# Patient Record
Sex: FEMALE | NOT HISPANIC OR LATINO | Employment: FULL TIME | URBAN - METROPOLITAN AREA
[De-identification: names, ages, dates, MRNs, and addresses within clinical notes are randomized per-mention and may not be internally consistent; named-entity substitution may affect disease eponyms.]

---
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
## 2020-01-08 ENCOUNTER — OFFICE VISIT (OUTPATIENT)
Dept: OBSTETRICS AND GYNECOLOGY | Facility: CLINIC | Age: 20
End: 2020-01-08

## 2020-01-08 VITALS
HEIGHT: 62 IN | BODY MASS INDEX: 22.6 KG/M2 | SYSTOLIC BLOOD PRESSURE: 118 MMHG | DIASTOLIC BLOOD PRESSURE: 66 MMHG | WEIGHT: 122.8 LBS

## 2020-01-08 DIAGNOSIS — Z09 POSTOPERATIVE FOLLOW-UP: Primary | ICD-10-CM

## 2020-01-08 PROCEDURE — 99024 POSTOP FOLLOW-UP VISIT: CPT | Performed by: OBSTETRICS & GYNECOLOGY

## 2020-01-08 NOTE — PROGRESS NOTES
Chief Complaint   Patient presents with   • Post-op       Yoshi Li is a 19 y.o. year old  presenting to be seen for her post-operative visit following a diagnostic laparoscopy done on 2019.  This patient had a history of primary dysmenorrhea.  She has been treated with Ortho-Cyclen oral contraceptives with cycle control and partial relief of dysmenorrhea.  At the time of her laparoscopy there were no abnormal findings.  There was no evidence of endometriosis, PID or adhesive disease.  The patient tolerated the procedure well.    Procedure:  Dx Laparoscopy    ROS:  A comprehensive review of systems was negative.  Constitutional: negative for fever, chills, activity change, appetite change, fatigue and unexpected weight change.  Respiratory: negative   Cardiovascular: negative  Gastrointestinal: negative  Genitourinary:negative  Musculoskeletal:negative  Behavioral/Psych: negative      Symptoms:  Fever  No, Nausea/Vomiting    No, Normal Bowel Function  Yes, Normal Urinary Function  Yes and Abnormal Discharge   No    LMP normal        Physical Exam:  Lungs:  Normal expansion.  Clear to auscultation.  No rales, rhonchi, or wheezing.  Abdomen:  Soft, non-tender, normal bowel sounds; no bruits, organomegaly or masses.  Incisions are intact and dry   Pelvic:  Clinical staff was present for exam  External genitalia:  normal appearance of the external genitalia including Bartholin's and [place]'s glands.  Cervix:  normal appearance.  Uterus:  normal size, shape and consistency. anteverted;  Adnexa:  normal bimanual exam of the adnexa.          Impression: 1) S/P diagnostic laparoscopy for dysmenorrhea with negative findings       PLAN:  1. Follow up: 12 month(s) for AG  2. Continue Ortho-Cyclen oral contraceptives  *Please note that portions of this documentation may have been completed with a voice recognition program.  Efforts were made to edit this dictation, but occasional words may have been  mistranscribed.      This note was electronically signed.    YARELY Veronica MD  January 8, 2020  1:35 PM

## 2020-02-12 ENCOUNTER — OFFICE VISIT (OUTPATIENT)
Dept: GASTROENTEROLOGY | Facility: CLINIC | Age: 20
End: 2020-02-12

## 2020-02-12 VITALS
TEMPERATURE: 98.7 F | SYSTOLIC BLOOD PRESSURE: 108 MMHG | HEART RATE: 63 BPM | WEIGHT: 121.4 LBS | HEIGHT: 62 IN | OXYGEN SATURATION: 99 % | BODY MASS INDEX: 22.34 KG/M2 | DIASTOLIC BLOOD PRESSURE: 58 MMHG

## 2020-02-12 DIAGNOSIS — K58.0 IRRITABLE BOWEL SYNDROME WITH DIARRHEA: ICD-10-CM

## 2020-02-12 DIAGNOSIS — K21.9 CHRONIC GERD: Primary | ICD-10-CM

## 2020-02-12 PROCEDURE — 99213 OFFICE O/P EST LOW 20 MIN: CPT | Performed by: NURSE PRACTITIONER

## 2020-02-12 RX ORDER — DICYCLOMINE HCL 20 MG
20 TABLET ORAL 4 TIMES DAILY PRN
Qty: 30 TABLET | Refills: 2 | Status: SHIPPED | OUTPATIENT
Start: 2020-02-12 | End: 2022-09-26

## 2020-02-12 NOTE — PATIENT INSTRUCTIONS
"Low-Gluten Eating Plan  Gluten is a protein that is found in wheat, barley, rye, and triticale, a hybrid of wheat and rye. Some people have a condition that makes them unable to digest gluten. For those people, eating just a small amount of gluten can damage their intestines.  This is not a gluten-free eating plan. This low-gluten eating plan is for people who feel better when they eat less gluten.  What are tips for following this plan?  Reading food labels  · Make sure to read food labels.  · Look for wheat, rye, barley, oats (unless it says \"certified gluten-free\"), malt, and ricks's yeast. If the food contains any of these, it has gluten in it.  · Wheat-free does not mean gluten-free.  Meal planning  · Eat a variety of foods so you get all of the nutrients that you need.  · To have more control over the ingredients in your meals, consider making food yourself instead of buying prepared foods.  General information  · Many gluten-free grain products are not fortified with vitamins and minerals like gluten-containing grains are. Because of this, there is a risk of nutrient deficiencies if you do not eat a balanced diet. Make sure to meet with your health care provider or a registered dietitian to review your diet.  · When eating out, look for restaurants that have gluten-free options or can make substitutions to accommodate this eating plan. Consider calling a restaurant ahead of time to discuss their menu.  What foods can I eat?    With this eating plan, you can eat anything that is labeled \"gluten-free\" or that does not contain wheat or other grains that have gluten.  Fruits  All fruits, such as bananas, apples, oranges, grapes, papaya, reyna, pomegranate, kiwi, grapefruit, and cherries.  Vegetables  All vegetables that are not in a sauce that would contain gluten, such as lettuce, spinach, peas, beets, cauliflower, cabbage, broccoli, carrots, tomatoes, squash, eggplant, herbs, peppers, onions, cucumbers, " "[place] sprouts, yams, and sweet potatoes.  Grains  Naturally gluten-free grains and flours, including rice, bulgur, quinoa, corn, buckwheat, cassava, amaranth, millet, polenta or cornmeal, tapioca, flax, nerissa, yucca, sorghum, and teff. Corn tortillas or taco shells. Oatmeal that is labeled as \"gluten-free\" or \"uncontaminated.\" Nut flours.  Meats and other proteins  Beef. Pork. Chicken. Turkey. Fish. Eggs. Tofu. Beans. Nuts. Lentils.  Dairy  Milk. Ice cream. Yogurt. Cheese. Cottage cheese.  Beverages  Water. Coffee. Tea. Juice. Soda. [place] water. Distilled alcohols. Wine.  Seasonings and condiments  Mustard. Relish. Ketchup. Barbecue sauce. Vinegar. Mayonnaise. Tamari.  Sweets and desserts  Honey. Sugar. Maple syrup.  Fats and oils  Butter. Vegetable oil. Olive oil. Canola oil. [place] oil.  Other foods  Arrowroot or cornstarch. Potato flour.  The items listed above may not be a complete list of foods and beverages you can eat. Contact a dietitian for more information.  What foods should I avoid?  Grains  Wheat. Barley. Rye. Oatmeal that is not certified gluten-free. Triticale.  Meats and other proteins  Seitan. Precooked or cured meat, such as sausages or meat loaves. Hot dogs. Salami.  Beverages  Beer, antonietta, lager, and malt beverages made from gluten-containing grains.  Seasonings and condiments  Malt vinegar. Salad dressing. Soy sauce. Teriyaki sauce. Marinades. Check the label of any pre-made sauces for a full list of ingredients.  Sweets and desserts  Licorice. Brown rice syrup. Pre-made pudding or pudding mixes.  Other foods  Bouillon cubes. Canned or boxed pre-made soups or soup packets. Bagged chips, such as potato chips and tortilla chips. Seasoning packets. Energy bars. Seasoned rice mixes. Processed foods.  The items listed above may not be a complete list of foods and beverages to avoid. Contact a dietitian for more information.  Summary  · Gluten is a protein that is found in wheat, barley, rye, and " "triticale, a hybrid of wheat and rye.  · This low-gluten eating plan is for people who feel better when they eat less gluten.  · Reading food labels of packaged foods is the best way to make sure you are following a low-gluten eating plan. Look for \"gluten-free\" on the label.  · Eat a variety of foods and colors and use whole grains that are naturally gluten-free to reduce your risk of having any nutrient deficiencies.  This information is not intended to replace advice given to you by your health care provider. Make sure you discuss any questions you have with your health care provider.  Document Released: 05/03/2016 Document Revised: 08/21/2019 Document Reviewed: 08/21/2019  [employer] Interactive Patient Education © 2019 [employer]    Gluten-Free Diet for Celiac Disease, Adult    The gluten-free diet includes all foods that do not contain gluten. Gluten is a protein that is found in wheat, rye, barley, and some other grains. Following the gluten-free diet is the only treatment for people with celiac disease. It helps to prevent damage to the intestines and improves or eliminates the symptoms of celiac disease.  Following the gluten-free diet requires some planning. It can be challenging at first, but it gets easier with time and practice. There are more gluten-free options available today than ever before. If you need help finding gluten-free foods or if you have questions, talk with your diet and nutrition specialist (registered dietitian) or your health care provider.  What do I need to know about a gluten-free diet?  · All fruits, vegetables, and meats are safe to eat and do not contain gluten.  · When grocery shopping, start by shopping in the produce, meat, and dairy sections. These sections are more likely to contain gluten-free foods. Then move to the aisles that contain packaged foods if you need to.  · Read all food labels. Gluten is often added to foods. Always check the ingredient list and look for " "warnings, such as “may contain gluten.\"  · Talk with your dietitian or health care provider before taking a gluten-free multivitamin or mineral supplement.  · Be aware of gluten-free foods having contact with foods that contain gluten (cross-contamination). This can happen at home and with any processed foods.  ? Talk with your health care provider or dietitian about how to reduce the risk of cross-contamination in your home.  ? If you have questions about how a food is processed, ask the .  What key words help to identify gluten?  Foods that list any of these key words on the label usually contain gluten:  · Wheat, flour, enriched flour, bromated flour, white flour, durum flour, lilli flour, phosphated flour, self-rising flour, semolina, farina, barley (malt), rye, and oats.  · Starch, dextrin, modified food starch, or cereal.  · Thickening, fillers, or emulsifiers.  · Malt flavoring, malt extract, or malt syrup.  · Hydrolyzed vegetable protein.  In the [place] packaged foods that are gluten-free are required to be labeled “GF.” These foods should be easy to identify and are safe to eat. In the [place] food companies are also required to list common food allergens, including wheat, on their labels.  Recommended foods  Grains  · Amaranth, bean flours, 100% buckwheat flour, corn, millet, nut flours or nut meals, GF oats, quinoa, rice, sorghum, teff, rice wafers, pure cornmeal tortillas, popcorn, and hot cereals made from cornmeal. [place], rice, wild rice. Some Asian rice noodles or bean noodles. Arrowroot starch, corn bran, corn flour, corn germ, cornmeal, corn starch, potato flour, potato starch flour, and rice bran. Plain, brown, and sweet rice flours. Rice polish, soy flour, and tapioca starch.  Vegetables  · All plain fresh, frozen, and canned vegetables.  Fruits  · All plain fresh, frozen, canned, and dried fruits, and 100% fruit juices.  Meats and other protein foods  · All fresh beef, pork, poultry, " fish, seafood, and eggs. Fish canned in water, oil, brine, or vegetable broth. Plain nuts and seeds, peanut butter. Some lunch meat and some frankfurters. Dried beans, dried peas, and lentils.  Dairy  · Fresh plain, dry, evaporated, or condensed milk. Cream, butter, sour cream, whipping cream, and most yogurts. Unprocessed cheese, most processed cheeses, some cottage cheese, some cream cheeses.  Beverages  · Coffee, tea, most herbal teas. Carbonated beverages and some root beers. Wine, sake, and distilled spirits, such as gin, vodka, and whiskey. Most hard ciders.  Fats and oils  · Butter, margarine, vegetable oil, hydrogenated butter, olive oil, shortening, lard, cream, and some mayonnaise. Some commercial salad dressings. Olives.  Sweets and desserts  · Sugar, honey, some syrups, molasses, jelly, and jam. Plain hard candy, marshmallows, and gumdrops. Pure cocoa powder. Plain chocolate. Custard and some pudding mixes. Gelatin desserts, sorbets, frozen ice pops, and sherbet. Cake, cookies, and other desserts prepared with allowed flours. Some commercial ice creams. Cornstarch, tapioca, and rice puddings.  Seasoning and other foods  · Some canned or frozen soups. Monosodium glutamate (MSG). Cider, rice, and wine vinegar. Baking soda and baking powder. Cream of tartar. Baking and nutritional yeast. Certain soy sauces made without wheat (ask your dietitian about specific brands that are allowed). Nuts, coconut, and chocolate. Salt, pepper, herbs, spices, flavoring extracts, imitation or artificial flavorings, natural flavorings, and food colorings. Some medicines and supplements. Some lip glosses and other cosmetics. Rice syrups.  The items listed may not be a complete list. Talk with your dietitian about what dietary choices are best for you.  Foods to avoid  Grains  · Barley, bran, bulgur, couscous, cracked wheat, [place], farro, lilli, malt, matzo, semolina, wheat germ, and all wheat and rye cereals including spelt  and kamut. Cereals containing malt as a flavoring, such as rice cereal. Noodles, spaghetti, macaroni, most packaged rice mixes, and all mixes containing wheat, rye, barley, or triticale.  Vegetables  · Most creamed vegetables and most vegetables canned in sauces. Some commercially prepared vegetables and salads.  Fruits  · Thickened or prepared fruits and some pie fillings. Some fruit snacks and fruit roll-ups.  Meats and other protein foods  · Any meat or meat alternative containing wheat, rye, barley, or gluten stabilizers. These are often marinated or packaged meats and lunch meats. Bread-containing products, such as Swiss steak, croquettes, meatballs, and meatloaf. Most tuna canned in vegetable broth and turkey with hydrolyzed vegetable protein (HVP) injected as part of the basting. Seitan. Imitation fish. Eggs in sauces made from ingredients to avoid.  Dairy  · Commercial chocolate milk drinks and malted milk. Some non-dairy creamers. Any cheese product containing ingredients to avoid.  Beverages  · Certain cereal beverages. Beer, antonietta, malted milk, and some root beers. Some hard ciders. Some instant flavored coffees. Some herbal teas made with barley or with barley malt added.  Fats and oils  · Some commercial salad dressings. Sour cream containing modified food starch.  Sweets and desserts  · Some toffees. Chocolate-coated nuts (may be rolled in wheat flour) and some commercial candies and candy bars. Most cakes, cookies, donuts, pastries, and other baked goods. Some commercial ice cream. Ice cream cones. Commercially prepared mixes for cakes, cookies, and other desserts. Bread pudding and other puddings thickened with flour. Products containing brown rice syrup made with barley malt enzyme. Desserts and sweets made with malt flavoring.  Seasoning and other foods  · Some marcos powders, some dry seasoning mixes, some gravy extracts, some meat sauces, some ketchups, some prepared mustards, and horseradish.  "Certain soy sauces. Malt vinegar. Bouillon and bouillon cubes that contain HVP. Some chip dips, and some chewing gum. Yeast extract. Mckeon’s yeast. Caramel color. Some medicines and supplements. Some lip glosses and other cosmetics.  The items listed may not be a complete list. Talk with your dietitian about what dietary choices are best for you.  Summary  · Gluten is a protein that is found in wheat, rye, barley, and some other grains. The gluten-free diet includes all foods that do not contain gluten.  · If you need help finding gluten-free foods or if you have questions, talk with your diet and nutrition specialist (registered dietitian) or your health care provider.  · Read all food labels. Gluten is often added to foods. Always check the ingredient list and look for warnings, such as “may contain gluten.\"  This information is not intended to replace advice given to you by your health care provider. Make sure you discuss any questions you have with your health care provider.  Document Released: 12/18/2006 Document Revised: 10/02/2017 Document Reviewed: 10/02/2017  Elsevier Interactive Patient Education © 2019 Elsevier Inc.    "

## 2020-02-12 NOTE — PROGRESS NOTES
GASTROENTEROLOGY OUTPATIENT ESTABLISHED PATIENT NOTE  Patient: CAMERON LI : 2000  Date of Service: 2020  CC: Follow-up    Assessment/Plan                                             ASSESSMENT & PLANS     Chronic GERD  · Decrease Protonix to PRN as needed    Irritable bowel syndrome with diarrhea  -     Start dicyclomine (BENTYL) 20 MG tablet; Take 1 tablet by mouth 4 (Four) Times a Day As Needed (Abdominal Pain/Cramping).  · Low gluten/gluten-free diet      Follow Up:Return if symptoms worsen or fail to improve, for Recheck.      DISCUSSION: The above plan was delineated in details with patient and mother and all questions and concerns were answered.  Patient is also given contact information.  Patient is to return as scheduled or sooner if new problems arise.   Subjective                                                     SUBJECTIVE   History of Present Illness  Ms. Cameron Li is a 19 y.o. female who is here for Follow-up  Changing to Protonix helps some  Still has lots of reflux and nausea.   Cramps and diarrhea. Does not have diarrhea   Has followed dietary changes  Has seen OB-GYN for pelvic pain.      EGD, done on 19 by Dr Simental, [facility]     ROS:Review of Systems   Constitutional: Positive for appetite change.        Pt currently or recently takes NSAIDS ( (i.e Ibuprofen, Aleve, Advil, Exedrin, BC Powder, diclofenac, meloxicam, & Naproxen, etc)? No    Pt currently or recently takes abx? No   HENT: Negative.    Eyes: Negative.    Respiratory: Negative.    Cardiovascular: Negative.    Gastrointestinal: Positive for abdominal pain and nausea.        Acid reflux   Endocrine: Negative.    Genitourinary: Negative.    Musculoskeletal: Negative.    Skin: Negative.    Allergic/Immunologic: Negative.    Neurological: Negative.    Hematological: Negative.    Psychiatric/Behavioral: Negative.      Objective                                                           OBJECTIVE   Allergy:  Pt has No Known Allergies.  MEDS: •  norgestimate-ethinyl estradiol (ORTHO-CYCLEN) 0.25-35 MG-MCG per tablet, Take 1 tablet by mouth Daily., Disp: , Rfl:   •  pantoprazole (PROTONIX) 40 MG EC tablet, Take 1 tablet by mouth Every Morning. Take first thing in the morning on an empty stomach.  Wait at least 30 min to 1hr before eating., Disp: 90 tablet, Rfl: 0  Probiotic      Wt Readings from Last 5 Encounters:   02/12/20 55.1 kg (121 lb 6.4 oz) (38 %, Z= -0.30)*   01/08/20 55.7 kg (122 lb 12.8 oz) (41 %, Z= -0.22)*   11/14/19 54.7 kg (120 lb 9.6 oz) (37 %, Z= -0.32)*   07/31/19 54.4 kg (120 lb) (38 %, Z= -0.32)*   03/22/19 54.2 kg (119 lb 6.4 oz) (38 %, Z= -0.31)*     * Growth percentiles are based on CDC (Girls, 2-20 Years) data.   body mass index is 22.2 kg/m².,Temp: 98.7 °F (37.1 °C),BP: 108/58,Heart Rate: 63   Physical Exam  General Well developed; well nourished. NAD  ENT Anicteric sclerae. Oral mucosa pink and moist without thrush or lesions    GI Abd soft, NT, ND, normal active bowel sounds.  No HSM.  No abd hernia    Pt care team: Samara HAWK & Jaskaran Ryan GINGER  02/12/20 2:43 PM  [facility]--Gastroenterology  [phone]    CC: , Lucille COLLINS MD   [address] [place], [place] [id number] FAX:[phone]    Answers for HPI/ROS submitted by the patient on 2/10/2020   What is the primary reason for your visit?: Other  Please describe your symptoms.: stomach issues  Have you had these symptoms before?: Yes  How long have you been having these symptoms?: 1-4 weeks ago  Please list any medications you are currently taking for this condition.: protonix

## 2020-02-13 DIAGNOSIS — K21.9 CHRONIC GERD: ICD-10-CM

## 2020-02-13 RX ORDER — PANTOPRAZOLE SODIUM 40 MG/1
40 TABLET, DELAYED RELEASE ORAL EVERY MORNING
Qty: 90 TABLET | Refills: 0 | Status: SHIPPED | OUTPATIENT
Start: 2020-02-13 | End: 2020-04-27

## 2020-02-28 ENCOUNTER — PATIENT MESSAGE (OUTPATIENT)
Dept: GASTROENTEROLOGY | Facility: CLINIC | Age: 20
End: 2020-02-28

## 2020-03-02 NOTE — TELEPHONE ENCOUNTER
From: Yoshi Li  To: Samara Saldivar APRN  Sent: 2/28/2020 9:21 AM EST  Subject: Non-Urgent Medical Question    Hi was just contacting you to see if it would be possible to get an excuse for missing my class today as my stomach was bothering me too much to attend class. Thank you.

## 2020-03-03 ENCOUNTER — PATIENT MESSAGE (OUTPATIENT)
Dept: GASTROENTEROLOGY | Facility: CLINIC | Age: 20
End: 2020-03-03

## 2020-03-05 NOTE — TELEPHONE ENCOUNTER
From: Yoshi Li  To: Samara Saldivar APRN  Sent: 3/3/2020 10:38 PM EST  Subject: Non-Urgent Medical Question    Is it possible to get a letter I can send to my professors that excuses me for any other times I might have to miss due to my stomach? My professors all require excuses within 24 hours of the absences and I know it can be difficult to get an excuse with that short of notice. I just want to avoid being unexcused for something that may have a huge impact on my grades. Thank you so much.

## 2020-03-06 ENCOUNTER — TELEPHONE (OUTPATIENT)
Dept: GASTROENTEROLOGY | Facility: CLINIC | Age: 20
End: 2020-03-06

## 2020-03-06 NOTE — TELEPHONE ENCOUNTER
Jaskaran:  Tracy, patient mother, called and would like to speak to you regarding Yoshi. Please return her call @ [phone].  Thank you,  Britta

## 2020-03-09 ENCOUNTER — PATIENT MESSAGE (OUTPATIENT)
Dept: GASTROENTEROLOGY | Facility: CLINIC | Age: 20
End: 2020-03-09

## 2020-03-17 NOTE — TELEPHONE ENCOUNTER
From: Emelina Li  To: Samara Saldivar APRN  Sent: 3/9/2020 2:24 PM EDT  Subject: Non-Urgent Medical Question    There are not specific dates I need an excuse for, I was just seeing if it would be possible to get a letter from you gulorie to give to my professors explaining my stomach issues in the hope to be able to make up any work I may miss due to my stomach. Professors will not accept an excuse later than 24 hours after the date of absence therefore the excuse you sent me before was invalid and I was not able to make up the work. However, if I had a note explaining my stomach issues they may accept that and allow me to be excused for any work I may miss in the future due to a flare up.  ----- Message -----  From: HERIBERTO MOORE  Sent: 3/5/2020 10:52 AM EST  To: Emelina Li  Subject: RE: Non-Urgent Medical Question   Emelina,  What dates do you need for additional school excuse?     ----- Message -----   From: Emelina Li   Sent: 3/3/2020 10:38 PM EST   To: KENN Trinidad  Subject: Non-Urgent Medical Question    Is it possible to get a letter I can send to my professors that excuses me for any other times I might have to miss due to my stomach? My professors all require excuses within 24 hours of the absences and I know it can be difficult to get an excuse with that short of notice. I just want to avoid being unexcused for something that may have a huge impact on my grades. Thank you so much.

## 2020-03-18 ENCOUNTER — PATIENT MESSAGE (OUTPATIENT)
Dept: GASTROENTEROLOGY | Facility: CLINIC | Age: 20
End: 2020-03-18

## 2020-03-19 NOTE — TELEPHONE ENCOUNTER
From: Emelina Li  To: Samara Saldivar APRN  Sent: 3/18/2020 4:23 PM EDT  Subject: Non-Urgent Medical Question    It’s not listed under the letters unfortunately. A copy sent by mail would be great! Thank you.  ----- Message -----  From: HERIBERTO MOORE  Sent: 3/17/2020 11:35 AM EDT  To: Emelina Li  Subject: RE: Non-Urgent Medical Question  Its should be under letters in your my chart. I don't have any control over my chart but I will be glad to mail the letter to you. NINO Jessica.    ----- Message -----   From: Emelina Li   Sent: 3/9/2020 2:24 PM EDT   To: KENN Trinidad  Subject: Non-Urgent Medical Question    There are not specific dates I need an excuse for, I was just seeing if it would be possible to get a letter from you guys to give to my professors explaining my stomach issues in the hope to be able to make up any work I may miss due to my stomach. Professors will not accept an excuse later than 24 hours after the date of absence therefore the excuse you sent me before was invalid and I was not able to make up the work. However, if I had a note explaining my stomach issues they may accept that and allow me to be excused for any work I may miss in the future due to a flare up.  ----- Message -----  From: HERIBERTO MOORE  Sent: 3/5/2020 10:52 AM EST  To: Emelina Li  Subject: RE: Non-Urgent Medical Question   Emelina,  What dates do you need for additional school excuse?     ----- Message -----   From: Emelina Li   Sent: 3/3/2020 10:38 PM EST   To: KENN Trinidad  Subject: Non-Urgent Medical Question    Is it possible to get a letter I can send to my professors that excuses me for any other times I might have to miss due to my stomach? My professors all require excuses within 24 hours of the absences and I know it can be difficult to get an excuse with that short of notice. I just want to avoid being unexcused for something  that may have a huge impact on my grades. Thank you so much.

## 2020-04-26 DIAGNOSIS — K21.9 CHRONIC GERD: ICD-10-CM

## 2020-04-27 RX ORDER — PANTOPRAZOLE SODIUM 40 MG/1
TABLET, DELAYED RELEASE ORAL
Qty: 30 TABLET | Refills: 0 | Status: SHIPPED | OUTPATIENT
Start: 2020-04-27 | End: 2020-09-25 | Stop reason: SDUPTHER

## 2020-09-21 ENCOUNTER — TELEPHONE (OUTPATIENT)
Dept: OBSTETRICS AND GYNECOLOGY | Facility: CLINIC | Age: 20
End: 2020-09-21

## 2020-09-21 RX ORDER — NORGESTIMATE AND ETHINYL ESTRADIOL 0.25-0.035
1 KIT ORAL DAILY
Qty: 84 TABLET | Refills: 1 | Status: SHIPPED | OUTPATIENT
Start: 2020-09-21 | End: 2020-09-29 | Stop reason: SDUPTHER

## 2020-09-21 NOTE — TELEPHONE ENCOUNTER
----- Message from Kenyatta Still MA sent at 9/21/2020  8:30 AM EDT -----  Regarding: FW: Prescription Question  Contact: [phone]    ----- Message -----  From: Yoshi Li  Sent: 9/18/2020  10:55 AM EDT  To: [facility]  Subject: RE: Prescription Question                        [facility] Thank you so much!   ----- Message -----  From: HERIBERTO MILLAN  Sent: 9/18/20, 10:54 AM  To: Yoshi Li  Subject: RE: Prescription Question    Does the prescription need to go to [facility] in [place], or to [facility]?      ----- Message -----       From:Yoshi Li       Sent:9/17/2020 10:13 AM EDT         To:YARELY Veronica MD    Subject:Prescription Question    Hi, I would like to get my prescription refilled as I only have one month supply left and I do not have an appointment with you gulorie until December. Thank you!

## 2020-09-21 NOTE — TELEPHONE ENCOUNTER
----- Message from Kenyatta Still MA sent at 9/21/2020  8:30 AM EDT -----  Regarding: FW: Prescription Question  Contact: [phone]    ----- Message -----  From: Yoshi Li  Sent: 9/18/2020  10:55 AM EDT  To: [facility]  Subject: RE: Prescription Question                        [facility] Thank you so much!   ----- Message -----  From: HERIBERTO MILLAN  Sent: 9/18/20, 10:54 AM  To: Yoshi Li  Subject: RE: Prescription Question    Does the prescription need to go to [facility] [place], or to [facility]?      ----- Message -----       From:Yoshi Li       Sent:9/17/2020 10:13 AM EDT         To:YARELY Veronica MD    Subject:Prescription Question    Hi, I would like to get my prescription refilled as I only have one month supply left and I do not have an appointment with you gulorie until December. Thank you!

## 2020-09-25 DIAGNOSIS — K21.9 CHRONIC GERD: ICD-10-CM

## 2020-09-25 RX ORDER — PANTOPRAZOLE SODIUM 40 MG/1
40 TABLET, DELAYED RELEASE ORAL DAILY
Qty: 90 TABLET | Refills: 0 | Status: SHIPPED | OUTPATIENT
Start: 2020-09-25 | End: 2020-12-24

## 2020-09-29 ENCOUNTER — TELEPHONE (OUTPATIENT)
Dept: OBSTETRICS AND GYNECOLOGY | Facility: CLINIC | Age: 20
End: 2020-09-29

## 2020-09-29 RX ORDER — NORGESTIMATE AND ETHINYL ESTRADIOL 0.25-0.035
1 KIT ORAL DAILY
Qty: 84 TABLET | Refills: 1 | Status: SHIPPED | OUTPATIENT
Start: 2020-09-29 | End: 2020-12-21

## 2020-09-29 NOTE — TELEPHONE ENCOUNTER
Appointment 12/21/2020.  We received a fax request for patient's birth control pills.  This prescription had been sent to her local pharmacy.  Transferring prescription to [facility]

## 2020-10-19 RX ORDER — NORGESTIMATE AND ETHINYL ESTRADIOL 0.25-0.035
1 KIT ORAL DAILY
Qty: 28 TABLET | Refills: 0 | Status: SHIPPED | OUTPATIENT
Start: 2020-10-19 | End: 2020-10-19 | Stop reason: SDUPTHER

## 2020-10-19 RX ORDER — NORGESTIMATE AND ETHINYL ESTRADIOL 0.25-0.035
1 KIT ORAL DAILY
Qty: 28 TABLET | Refills: 0 | Status: SHIPPED | OUTPATIENT
Start: 2020-10-19 | End: 2020-12-21 | Stop reason: ALTCHOICE

## 2020-10-19 NOTE — TELEPHONE ENCOUNTER
Patient's mother calls today regarding Yoshi's birth control pill prescription.  The patient is having difficulty getting her prescription from [facility], although our records indicate that the prescription was sent to [facility] and received on 9/29/2020.  The patient's mother states that [facility] has stated that they will mail prescription to patient today, but she needs to start pills now.  They are requesting a one time prescription to be sent to the local pharmacy.    Dr. Veronica, please approve prescription.  Patient has appointment in December, and if she receives 3 packs from [facility], should not need another prescription until the time of her appointment.

## 2020-10-20 NOTE — TELEPHONE ENCOUNTER
I called the patient's mother, Tracy, because our records indicate that the prescription failed to transmit to the pharmacy X 2.  I also called the pharmacy several times this morning, and after one ring, the call does not connect.    The patient's mother indicates that Yoshi's prescription was at the pharmacy and she picked it up last night.

## 2020-12-21 ENCOUNTER — OFFICE VISIT (OUTPATIENT)
Dept: OBSTETRICS AND GYNECOLOGY | Facility: CLINIC | Age: 20
End: 2020-12-21

## 2020-12-21 VITALS
BODY MASS INDEX: 23.08 KG/M2 | HEIGHT: 62 IN | WEIGHT: 125.4 LBS | DIASTOLIC BLOOD PRESSURE: 80 MMHG | SYSTOLIC BLOOD PRESSURE: 128 MMHG

## 2020-12-21 DIAGNOSIS — N94.6 DYSMENORRHEA: ICD-10-CM

## 2020-12-21 DIAGNOSIS — N60.11 FIBROCYSTIC BREAST CHANGES, BILATERAL: Primary | ICD-10-CM

## 2020-12-21 DIAGNOSIS — Z01.419 ENCOUNTER FOR GYNECOLOGICAL EXAMINATION WITHOUT ABNORMAL FINDING: ICD-10-CM

## 2020-12-21 DIAGNOSIS — N60.12 FIBROCYSTIC BREAST CHANGES, BILATERAL: Primary | ICD-10-CM

## 2020-12-21 DIAGNOSIS — Z30.41 SURVEILLANCE OF PREVIOUSLY PRESCRIBED CONTRACEPTIVE PILL: ICD-10-CM

## 2020-12-21 PROCEDURE — 99395 PREV VISIT EST AGE 18-39: CPT | Performed by: OBSTETRICS & GYNECOLOGY

## 2020-12-21 RX ORDER — FAMOTIDINE 40 MG/1
1 TABLET, FILM COATED ORAL NIGHTLY
COMMUNITY
Start: 2020-11-30 | End: 2021-01-20

## 2020-12-21 RX ORDER — LEVONORGESTREL / ETHINYL ESTRADIOL AND ETHINYL ESTRADIOL 150-30(84)
1 KIT ORAL DAILY
Qty: 91 EACH | Refills: 4 | Status: SHIPPED | OUTPATIENT
Start: 2020-12-21 | End: 2021-12-22 | Stop reason: SDUPTHER

## 2020-12-21 NOTE — PROGRESS NOTES
Chief Complaint   Patient presents with   • Med Refill     discuss contraception.   • Annual Exam     ? IBS.  patient has cramping starting 1 week prior to her period and lasting during the period.  unsure if this is dysmenorrhea or IBS.       Yoshi Li is a 20 y.o. year old  presenting to be seen for her annual exam.  This patient has a history of a prior laparoscopy  In 2019 with normal pelvic findings.  There was no evidence of endometriosis or pelvic adhesive disease.  She has been taking Ortho-Cyclen oral contraceptives but still has some discomfort in her lower abdomen at the time of menses.  She does have a history of irritable bowel syndrome-diarrhea dominant.  She denies side effects on oral contraceptives.  She denies urinary symptoms.  She has a history of fibrocystic changes of the breast.  SCREENING TESTS    Year 2012 2016  2018 2019 2020  202 [id number] 2030  203 2033   Age                         PAP        Neg.                 HPV high risk                         Mammogram                         FAMILIA score                         Breast MRI                         Lipids                         Vitamin D                         Colonoscopy                         DEXA  Frax (hip/any)                         Ovarian Screen                             She exercises regularly: yes.  She wears her seat belt: yes.  She has concerns about domestic violence: no.  She has noticed changes in height: no    GYN screening history:  · Last pap: was done on approximately 2019 and the result was: normal PAP..    No Additional Complaints Reported    The following portions of the patient's history were reviewed and updated as appropriate:vital signs and   She  has a past medical history of GERD (gastroesophageal reflux disease), IBS (irritable bowel syndrome), and IBS (irritable bowel syndrome).  She does not have any pertinent  "problems on file.  She  has a past surgical history that includes Mouth surgery; [place] tooth extraction; Diagnostic laparoscopy (12/20/2019); Abdominal surgery (laparoscopy December 20189); and Upper gastrointestinal endoscopy (August 2019).  Her Family history is unknown by patient.  She  reports that she has never smoked. She has never used smokeless tobacco. She reports that she does not drink alcohol or use drugs.  Current Outpatient Medications   Medication Sig Dispense Refill   • dicyclomine (BENTYL) 20 MG tablet Take 1 tablet by mouth 4 (Four) Times a Day As Needed (Abdominal Pain/Cramping). 30 tablet 2   • famotidine (PEPCID) 40 MG tablet Take 1 tablet by mouth Every Night.     • hyoscyamine (LEVSIN) 0.125 MG SL tablet As Needed.     • Levonorgest-Eth Estrad 91-Day (Seasonique) 0.15-0.03 &0.01 MG tablet Take 1 tablet by mouth Daily. 91 each 4   • pantoprazole (PROTONIX) 40 MG EC tablet Take 1 tablet by mouth Daily for 90 days. 90 tablet 0     No current facility-administered medications for this visit.      She has No Known Allergies..    Review of Systems  A review of systems was taken.  She denies cough, fever, shortness of breath, and loss of her sense of taste or smell  Constitutional: negative for fever, chills, activity change, appetite change, fatigue and unexpected weight change.  Respiratory: negative  Cardiovascular: negative  Gastrointestinal: diarrhea  Genitourinary:negative  Musculoskeletal:negative  Behavioral/Psych: negative     Counseling/Anticipatory Guidance Discussed: nutrition, family planning/contraception, physical activity, healthy weight and breast cancer and self breast exams    /80   Ht 157.5 cm (62\")   Wt 56.9 kg (125 lb 6.4 oz)   LMP 12/09/2020 (Exact Date)   Breastfeeding No   BMI 22.94 kg/m²     MEDICALLY INDICATED   Physical Exam    General:  alert; cooperative; well developed; well nourished   Skin:  No suspicious lesions seen   Thyroid: normal to inspection and " palpation   Lungs:  breathing is unlabored  clear to auscultation bilaterally   Heart:  regular rate and rhythm, S1, S2 normal, no murmur, click, rub or gallop  normal apical impulse   Breasts:  Examined in supine position  Symmetric without masses or skin dimpling  Nipples normal without inversion, lesions or discharge  There are no palpable axillary nodes  Fibrocystic changes are present both breasts without a discrete mass   Abdomen: soft, non-tender; no masses  no umbilical or inguinal hernias are present  no hepato-splenomegaly   Pelvis: Clinical staff was present for exam  External genitalia:  normal appearance of the external genitalia including Bartholin's and [place]'s glands.  Vaginal:  normal pink mucosa without prolapse or lesions.  Cervix:  normal appearance.  Uterus:  normal size, shape and consistency. anteverted;  Adnexa:  normal bimanual exam of the adnexa.  Rectal:  anus visually normal appearing. recto-vaginal exam unremarkable and confirms findings;     Lab Review   Pap results    Imaging  No data reviewed        Advance directives- NO (age)      ASSESSMENT  Problems Addressed this Visit        Nervous and Auditory    Dysmenorrhea       Other    Fibrocystic breast changes, bilateral - Primary      Other Visit Diagnoses     Surveillance of previously prescribed contraceptive pill        Relevant Medications    Levonorgest-Eth Estrad 91-Day (Seasonique) 0.15-0.03 &0.01 MG tablet    Encounter for gynecological examination without abnormal finding        Relevant Orders    Liquid-based Pap Smear, Screening      Diagnoses       Codes Comments    Fibrocystic breast changes, bilateral    -  Primary ICD-10-CM: N60.11, N60.12  ICD-9-CM: 610.1     Surveillance of previously prescribed contraceptive pill     ICD-10-CM: Z30.41  ICD-9-CM: V25.41     Encounter for gynecological examination without abnormal finding     ICD-10-CM: Z01.419  ICD-9-CM: V72.31     Dysmenorrhea     ICD-10-CM: N94.6  ICD-9-CM: 625.3                Substance History:   reports that she has never smoked. She has never used smokeless tobacco.   reports no history of alcohol use.   reports no history of drug use.    Substance use counseling is not indicated based on patient history.      PLAN    Medications prescribed this encounter:    New Medications Ordered This Visit   Medications   • Levonorgest-Eth Estrad 91-Day (Seasonique) 0.15-0.03 &0.01 MG tablet     Sig: Take 1 tablet by mouth Daily.     Dispense:  91 each     Refill:  4   · Pap test done  · After discussion with the patient I have elected to change her to Seasonique oral contraceptives to attempt to decrease the number of periods that she has.  Have also encouraged her to follow-up with gastroenterology.  · Regular weight-bearing exercise  · Follow up: 12 month(s)  *Please note that portions of this documentation may have been completed with a voice recognition program.  Efforts were made to edit this dictation, but occasional words may have been mistranscribed.       This note was electronically signed.    YARELY Veronica MD  December 21, 2020  16:07 EST

## 2020-12-30 DIAGNOSIS — K58.1 IRRITABLE BOWEL SYNDROME WITH CONSTIPATION: Primary | ICD-10-CM

## 2021-01-20 ENCOUNTER — TELEMEDICINE (OUTPATIENT)
Dept: GASTROENTEROLOGY | Facility: CLINIC | Age: 21
End: 2021-01-20

## 2021-01-20 DIAGNOSIS — K58.0 IRRITABLE BOWEL SYNDROME WITH DIARRHEA: Primary | ICD-10-CM

## 2021-01-20 DIAGNOSIS — K21.9 CHRONIC GERD: ICD-10-CM

## 2021-01-20 PROCEDURE — 99214 OFFICE O/P EST MOD 30 MIN: CPT | Performed by: NURSE PRACTITIONER

## 2021-01-20 RX ORDER — PANTOPRAZOLE SODIUM 20 MG/1
20 TABLET, DELAYED RELEASE ORAL DAILY
COMMUNITY
End: 2021-01-20

## 2021-01-20 RX ORDER — PANTOPRAZOLE SODIUM 40 MG/1
40 TABLET, DELAYED RELEASE ORAL DAILY
COMMUNITY
End: 2021-01-20

## 2021-01-20 RX ORDER — PANTOPRAZOLE SODIUM 20 MG/1
20 TABLET, DELAYED RELEASE ORAL 2 TIMES DAILY
Qty: 60 TABLET | Refills: 11 | Status: SHIPPED | OUTPATIENT
Start: 2021-01-20 | End: 2021-01-25 | Stop reason: SDUPTHER

## 2021-01-20 NOTE — PATIENT INSTRUCTIONS
Low-FODMAP Eating Plan    FODMAPs (fermentable oligosaccharides, disaccharides, monosaccharides, and polyols) are sugars that are hard for some people to digest. A low-FODMAP eating plan may help some people who have bowel (intestinal) diseases to manage their symptoms.  This meal plan can be complicated to follow. Work with a diet and nutrition specialist (dietitian) to make a low-FODMAP eating plan that is right for you. A dietitian can make sure that you get enough nutrition from this diet.  What are tips for following this plan?  Reading food labels  · Check labels for hidden FODMAPs such as:  ? High-fructose syrup.  ? Honey.  ? Agave.  ? Natural fruit flavors.  ? Onion or garlic powder.  · Choose low-FODMAP foods that contain 3-4 grams of fiber per serving.  · Check food labels for serving sizes. Eat only one serving at a time to make sure FODMAP levels stay low.  Meal planning  · Follow a low-FODMAP eating plan for up to 6 weeks, or as told by your health care provider or dietitian.  · To follow the eating plan:  1. Eliminate high-FODMAP foods from your diet completely.  2. Gradually reintroduce high-FODMAP foods into your diet one at a time. Most people should wait a few days after introducing one high-FODMAP food before they introduce the next high-FODMAP food. Your dietitian can recommend how quickly you may reintroduce foods.  3. Keep a daily record of what you eat and drink, and make note of any symptoms that you have after eating.  4. Review your daily record with a dietitian regularly. Your dietitian can help you identify which foods you can eat and which foods you should avoid.  General tips  · Drink enough fluid each day to keep your urine pale yellow.  · Avoid processed foods. These often have added sugar and may be high in FODMAPs.  · Avoid most dairy products, whole grains, and sweeteners.  · Work with a dietitian to make sure you get enough fiber in your diet.  Recommended  "foods  Grains  · Gluten-free grains, such as rice, oats, buckwheat, quinoa, corn, polenta, and millet. Gluten-free pasta, bread, or cereal. Rice noodles. Corn tortillas.  Vegetables  · Eggplant, zucchini, cucumber, peppers, green beans, [place] sprouts, bean sprouts, lettuce, arugula, kale, Swiss chard, spinach, juan greens, bok donald, summer squash, potato, and tomato. Limited amounts of corn, carrot, and sweet potato. Green parts of scallions.  Fruits  · Bananas, oranges, re, limes, blueberries, raspberries, strawberries, grapes, cantaloupe, honeydew melon, kiwi, papaya, passion fruit, and pineapple. Limited amounts of dried cranberries, banana chips, and shredded coconut.  Dairy  · Lactose-free milk, yogurt, and kefir. Lactose-free cottage cheese and ice cream. Non-dairy milks, such as almond, coconut, hemp, and rice milk. Yogurts made of non-dairy milks. Limited amounts of goat cheese, brie, mozzarella, parmesan, swiss, and other hard cheeses.  Meats and other protein foods  · Unseasoned beef, pork, poultry, or fish. Eggs. Harry. Tofu (firm) and tempeh. Limited amounts of nuts and seeds, such as almonds, walnuts, brazil nuts, pecans, peanuts, pumpkin seeds, nerissa seeds, and sunflower seeds.  Fats and oils  · Butter-free spreads. Vegetable oils, such as olive, canola, and sunflower oil.  Seasoning and other foods  · Artificial sweeteners with names that do not end in \"ol\" such as aspartame, saccharine, and stevia. Maple syrup, white table sugar, raw sugar, brown sugar, and molasses. Fresh basil, coriander, parsley, rosemary, and thyme.  Beverages  · Water and mineral water. Sugar-sweetened soft drinks. Small amounts of orange juice or cranberry juice. Black and green tea. Most dry shannan. Coffee.  This may not be a complete list of low-FODMAP foods. Talk with your dietitian for more information.  Foods to avoid  Grains  · Wheat, including kamut, durum, and semolina. Barley and bulgur. Couscous. Wheat-based " cereals. Wheat noodles, bread, crackers, and pastries.  Vegetables  · Chicory root, artichoke, asparagus, cabbage, snow peas, sugar snap peas, mushrooms, and cauliflower. Onions, garlic, leeks, and the white part of scallions.  Fruits  · Fresh, dried, and juiced forms of apple, pear, watermelon, peach, plum, cherries, apricots, blackberries, boysenberries, figs, nectarines, and reyna. Avocado.  Dairy  · Milk, yogurt, ice cream, and soft cheese. Cream and sour cream. Milk-based sauces. Custard.  Meats and other protein foods  · Fried or fatty meat. Sausage. Cashews and pistachios. Soybeans, baked beans, black beans, chickpeas, kidney beans, arabella beans, navy beans, lentils, and split peas.  Seasoning and other foods  · Any sugar-free gum or candy. Foods that contain artificial sweeteners such as sorbitol, mannitol, isomalt, or xylitol. Foods that contain honey, high-fructose corn syrup, or agave. Bouillon, vegetable stock, beef stock, and chicken stock. Garlic and onion powder. Condiments made with onion, such as hummus, chutney, pickles, relish, salad dressing, and salsa. Tomato paste.  Beverages  · Chicory-based drinks. Coffee substitutes. Chamomile tea. Fennel tea. Sweet or fortified shannan such as port or lucila. Diet soft drinks made with isomalt, mannitol, maltitol, sorbitol, or xylitol. Apple, pear, and reyna juice. Juices with high-fructose corn syrup.  This may not be a complete list of high-FODMAP foods. Talk with your dietitian to discuss what dietary choices are best for you.   Summary  · A low-FODMAP eating plan is a short-term diet that eliminates FODMAPs from your diet to help ease symptoms of certain bowel diseases.  · The eating plan usually lasts up to 6 weeks. After that, high-FODMAP foods are restarted gradually, one at a time, so you can find out which may be causing symptoms.  · A low-FODMAP eating plan can be complicated. It is best to work with a dietitian who has experience with this type of  plan.  This information is not intended to replace advice given to you by your health care provider. Make sure you discuss any questions you have with your health care provider.  Document Revised: 11/30/2018 Document Reviewed: 08/14/2018  Elsevier Patient Education © 2020 [employer]

## 2021-01-20 NOTE — PROGRESS NOTES
Follow Up      Patient Name: Yoshi Li  : 2000   MRN: [id number]     Chief Complaint:    Chief Complaint   Patient presents with   • Irritable Bowel Syndrome     You have chosen to receive care through a telehealth visit.  Do you consent to use a video/audio connection for your medical care today? Yes    History of Present Illness: Yoshi Li is a 20 y.o. female who is here today for follow up on IBS.  Symptoms include cramping with diarrhea.  Symptoms are intermittent- several times a week.  Cramping is relieved with defecation.  Bentyl is helpful but causes drowsiness.  Taking a PPI daily and pepcid was added in by pcp due to GERD breakthrough in the evening.  Tried levsin which did not help much. Has tried FODMAPS some but not fully.  Tries to avoid gluten which helped her symptoms. Probiotics help as well.      Subjective      Review of Systems:   Review of Systems    Medications:     Current Outpatient Medications:   •  dicyclomine (BENTYL) 20 MG tablet, Take 1 tablet by mouth 4 (Four) Times a Day As Needed (Abdominal Pain/Cramping)., Disp: 30 tablet, Rfl: 2  •  Levonorgest-Eth Estrad 91-Day (Seasonique) 0.15-0.03 &0.01 MG tablet, Take 1 tablet by mouth Daily., Disp: 91 each, Rfl: 4  •  pantoprazole (PROTONIX) 20 MG EC tablet, Take 1 tablet by mouth 2 (two) times a day., Disp: 60 tablet, Rfl: 11  •  rifAXIMin (Xifaxan) 200 MG tablet, Take 1 tablet by mouth 3 (Three) Times a Day., Disp: 42 tablet, Rfl: 1    Allergies:   No Known Allergies    Social History:   Social History     Socioeconomic History   • Marital status: Single     Spouse name: Not on file   • Number of children: Not on file   • Years of education: Not on file   • Highest education level: Not on file   Tobacco Use   • Smoking status: Never Smoker   • Smokeless tobacco: Never Used   Substance and Sexual Activity   • Alcohol use: No     Frequency: Never   • Drug use: No   • Sexual activity: Not Currently     Birth  control/protection: OCP        Surgical History:   Past Surgical History:   Procedure Laterality Date   • ABDOMINAL SURGERY  laparoscopy December 20189   • DIAGNOSTIC LAPAROSCOPY  12/20/2019   • MOUTH SURGERY      wisdom teeth   • UPPER GASTROINTESTINAL ENDOSCOPY  August 2019   • WISDOM TOOTH EXTRACTION          Medical History:   Past Medical History:   Diagnosis Date   • GERD (gastroesophageal reflux disease)    • IBS (irritable bowel syndrome)    • IBS (irritable bowel syndrome)         Objective     Physical Exam:  Vital Signs: There were no vitals filed for this visit.  There is no height or weight on file to calculate BMI.     Physical Exam  Constitutional:       General: She is not in acute distress.     Appearance: She is well-developed.   Pulmonary:      Effort: Pulmonary effort is normal. No accessory muscle usage or respiratory distress.   Skin:     Coloration: Skin is not pale.      Findings: No erythema.   Neurological:      Mental Status: She is alert and oriented to person, place, and time.   Psychiatric:         Speech: Speech normal.         Behavior: Behavior normal.         Thought Content: Thought content normal.         Judgment: Judgment normal.         Assessment / Plan      Assessment/Plan:   Diagnoses and all orders for this visit:    1. Irritable bowel syndrome with diarrhea (Primary)  -     rifAXIMin (Xifaxan) 200 MG tablet; Take 1 tablet by mouth 3 (Three) Times a Day.  Dispense: 42 tablet; Refill: 1  Continue to work on FODMAPs diet, as needed Bentyl, probiotics.  Will trial Xifaxan as this may give her some relief.  Advised she may repeat this so will provide 1 refill.  2. Chronic GERD  -     pantoprazole (PROTONIX) 20 MG EC tablet; Take 1 tablet by mouth 2 (two) times a day.  Dispense: 60 tablet; Refill: 11  EGD reviewed.  I recommend against 40 of pantoprazole and Pepcid.  Will discontinue Pepcid and try PPI twice daily-30 minutes before meals.         Follow Up:   Return in about 6  months (around 7/20/2021), or if symptoms worsen or fail to improve.    Plan of care reviewed with the patient at the conclusion of today's visit.  Education was provided regarding diagnosis, management, and any prescribed or recommended OTC medications.  Patient verbalized understanding of and agreement with management plan.     KENN Damon  [facility]     Please note that portions of this note may have been completed with a voice recognition program. Efforts were made to edit the dictations, but occasionally words are mistranscribed.

## 2021-01-25 DIAGNOSIS — K21.9 CHRONIC GERD: ICD-10-CM

## 2021-01-25 RX ORDER — PANTOPRAZOLE SODIUM 20 MG/1
20 TABLET, DELAYED RELEASE ORAL 2 TIMES DAILY
Qty: 60 TABLET | Refills: 11 | Status: SHIPPED | OUTPATIENT
Start: 2021-01-25 | End: 2021-12-21

## 2021-07-27 ENCOUNTER — OFFICE VISIT (OUTPATIENT)
Dept: GASTROENTEROLOGY | Facility: CLINIC | Age: 21
End: 2021-07-27

## 2021-07-27 ENCOUNTER — LAB (OUTPATIENT)
Dept: LAB | Facility: HOSPITAL | Age: 21
End: 2021-07-27

## 2021-07-27 VITALS
SYSTOLIC BLOOD PRESSURE: 118 MMHG | WEIGHT: 129.2 LBS | TEMPERATURE: 97.9 F | BODY MASS INDEX: 23.77 KG/M2 | HEART RATE: 113 BPM | DIASTOLIC BLOOD PRESSURE: 64 MMHG | HEIGHT: 62 IN | OXYGEN SATURATION: 99 %

## 2021-07-27 DIAGNOSIS — R53.82 CHRONIC FATIGUE: ICD-10-CM

## 2021-07-27 DIAGNOSIS — R11.14 BILIOUS VOMITING WITH NAUSEA: ICD-10-CM

## 2021-07-27 DIAGNOSIS — K58.0 IRRITABLE BOWEL SYNDROME WITH DIARRHEA: ICD-10-CM

## 2021-07-27 DIAGNOSIS — K21.9 GASTROESOPHAGEAL REFLUX DISEASE WITHOUT ESOPHAGITIS: Primary | ICD-10-CM

## 2021-07-27 PROCEDURE — 86038 ANTINUCLEAR ANTIBODIES: CPT

## 2021-07-27 PROCEDURE — 36415 COLL VENOUS BLD VENIPUNCTURE: CPT

## 2021-07-27 PROCEDURE — 99214 OFFICE O/P EST MOD 30 MIN: CPT | Performed by: NURSE PRACTITIONER

## 2021-07-27 NOTE — PROGRESS NOTES
Follow Up      Patient Name: Emelina Li  : 2000   MRN: [id number]     Chief Complaint:    Chief Complaint   Patient presents with   • Follow-up     6 mo follow up on GERD   • Abdominal Pain     Epigastric pain   • Nausea       History of Present Illness: Emelina Li is a 20 y.o. female who is here today for follow up on IBS and GERD.    Emelina reports continued GERD and nausea that wakes her up at night.  Vomiting will improve her symptoms.  This has been occuring on and off for the past 3 years.  There are not headaches or vision changes associated with this.  This does not happen every night but about 1-2 times a month. For her GERD she has tried PPI in addition to H2 receptor blockers- these are helpful but do not resolve her symptoms. She does sit with the HOB elevated and sits upright with after meals.  She denies any dysphagia.  Ginger tea is helpful to sooth her stomach.    IBS-D- in the Past she has tried Xifaxan, Levsin, low FODMAPs diet,  which have been minimally helpful for her irritable bowel.  She does report chronic fatigue. Denies joint swelling/ weight loss.     Currently her IBS symptoms are controlled with probiotic with as needed bentyl. She does report symptoms worsen during times of stress.    EGD, done on 19 by MATTHEW Evangelista   Abdomen Complete (2019 16:05)    Subjective      Review of Systems:   Review of Systems   Constitutional: Positive for fatigue. Negative for appetite change and unexpected weight loss.   HENT: Negative for trouble swallowing.    Gastrointestinal: Positive for nausea, vomiting, GERD and indigestion. Negative for abdominal distention, abdominal pain, anal bleeding, blood in stool, constipation, diarrhea and rectal pain.   Psychiatric/Behavioral: Negative for sleep disturbance.       Medications:     Current Outpatient Medications:   •  Levonorgest-Eth Estrad 91-Day (Seasonique) 0.15-0.03 &0.01 MG tablet, Take 1 tablet by mouth  "Daily., Disp: 91 each, Rfl: 4  •  pantoprazole (PROTONIX) 20 MG EC tablet, Take 1 tablet by mouth 2 (two) times a day., Disp: 60 tablet, Rfl: 11  •  dicyclomine (BENTYL) 20 MG tablet, Take 1 tablet by mouth 4 (Four) Times a Day As Needed (Abdominal Pain/Cramping)., Disp: 30 tablet, Rfl: 2    Allergies:   No Known Allergies    Social History:   Social History     Socioeconomic History   • Marital status: Single     Spouse name: Not on file   • Number of children: Not on file   • Years of education: Not on file   • Highest education level: Not on file   Tobacco Use   • Smoking status: Never Smoker   • Smokeless tobacco: Never Used   Vaping Use   • Vaping Use: Never used   Substance and Sexual Activity   • Alcohol use: No   • Drug use: No   • Sexual activity: Not Currently     Birth control/protection: OCP        Surgical History:   Past Surgical History:   Procedure Laterality Date   • ABDOMINAL SURGERY  laparoscopy December 20189   • DIAGNOSTIC LAPAROSCOPY  12/20/2019   • MOUTH SURGERY      wisdom teeth   • UPPER GASTROINTESTINAL ENDOSCOPY  August 2019   • WISDOM TOOTH EXTRACTION          Medical History:   Past Medical History:   Diagnosis Date   • GERD (gastroesophageal reflux disease)    • IBS (irritable bowel syndrome)    • IBS (irritable bowel syndrome)         Objective     Physical Exam:  Vital Signs:   Vitals:    07/27/21 1524   BP: 118/64   BP Location: Right arm   Patient Position: Lying   Cuff Size: Adult   Pulse: 113   Temp: 97.9 °F (36.6 °C)   TempSrc: Temporal   SpO2: 99%   Weight: 58.6 kg (129 lb 3.2 oz)   Height: 157.5 cm (62.01\")     Body mass index is 23.62 kg/m².     Physical Exam  Vitals and nursing note reviewed.   Constitutional:       General: She is not in acute distress.     Appearance: She is well-developed. She is not diaphoretic.   Eyes:      General: No scleral icterus.     Extraocular Movements:      Right eye: No nystagmus.      Left eye: No nystagmus.      Conjunctiva/sclera: " Conjunctivae normal.      Pupils: Pupils are equal, round, and reactive to light.   Neck:      Thyroid: No thyromegaly.   Cardiovascular:      Rate and Rhythm: Normal rate and regular rhythm.   Pulmonary:      Effort: Pulmonary effort is normal.      Breath sounds: Normal breath sounds.   Abdominal:      General: Bowel sounds are normal. There is no distension. There are no signs of injury.      Palpations: Abdomen is soft. There is no hepatomegaly or splenomegaly.      Tenderness: There is no abdominal tenderness.      Hernia: No hernia is present.   Musculoskeletal:      Cervical back: Neck supple.      Right lower leg: No edema.      Left lower leg: No edema.   Skin:     General: Skin is warm and dry.      Capillary Refill: Capillary refill takes 2 to 3 seconds.      Coloration: Skin is not jaundiced or pale.      Findings: No bruising or petechiae.      Nails: There is no clubbing.   Neurological:      Mental Status: She is alert and oriented to person, place, and time.   Psychiatric:         Behavior: Behavior normal.         Thought Content: Thought content normal.         Judgment: Judgment normal.         Assessment / Plan      Assessment/Plan:   Diagnoses and all orders for this visit:    1. Gastroesophageal reflux disease without esophagitis (Primary)  -     NM Gastric Emptying; Future  Continue with ppi, lifestyle measures, vishal.  R/O gastroparesis  2. Bilious vomiting with nausea  -     NM Gastric Emptying; Future  -     NM HIDA Scan With Pharmacological Intervention; Future    3. Irritable bowel syndrome with diarrhea  -     Stable, continue current treatment plan    4. Chronic fatigue  -     FADUMO With / DsDNA, RNP, Sjogrens A / B, Escudero; Future           Follow Up:   Return in about 2 months (around 9/27/2021).    Plan of care reviewed with the patient at the conclusion of today's visit.  Education was provided regarding diagnosis, management, and any prescribed or recommended OTC medications.   Patient verbalized understanding of and agreement with management plan.     Time Statement:   Discussed plan of care in detail with patient today. Patient verbally understands and agrees. I have spent 30 minutes reviewing available diagnostics, obtaining history, examining the patient, developing a treatment plan, and educating the patient on disease process and plan of care.     KENN Damon  [facility] [place] [facility]

## 2021-07-28 LAB — ANA SER QL: NEGATIVE

## 2021-08-10 ENCOUNTER — TELEPHONE (OUTPATIENT)
Dept: GASTROENTEROLOGY | Facility: CLINIC | Age: 21
End: 2021-08-10

## 2021-08-10 NOTE — TELEPHONE ENCOUNTER
I CALLED MS MAURER BACK. NO ANSWER; LEFT VOICE MESSAGE. PATIENT MAY STOP BY OFFICE TO  LETTER. I WILL ALSO MAIL HER A COPY.

## 2021-09-01 ENCOUNTER — APPOINTMENT (OUTPATIENT)
Dept: NUCLEAR MEDICINE | Facility: HOSPITAL | Age: 21
End: 2021-09-01

## 2021-09-17 ENCOUNTER — HOSPITAL ENCOUNTER (OUTPATIENT)
Dept: NUCLEAR MEDICINE | Facility: HOSPITAL | Age: 21
Discharge: HOME | End: 2021-09-17

## 2021-09-17 DIAGNOSIS — R11.14 BILIOUS VOMITING WITH NAUSEA: ICD-10-CM

## 2021-09-17 DIAGNOSIS — K21.9 GASTROESOPHAGEAL REFLUX DISEASE WITHOUT ESOPHAGITIS: ICD-10-CM

## 2021-09-17 PROCEDURE — 0 TECHNETIUM SULFUR COLLOID: Performed by: NURSE PRACTITIONER

## 2021-09-17 PROCEDURE — A9541 TC99M SULFUR COLLOID: HCPCS | Performed by: NURSE PRACTITIONER

## 2021-09-17 PROCEDURE — 78264 GASTRIC EMPTYING IMG STUDY: CPT

## 2021-09-17 RX ADMIN — TECHNETIUM TC 99M SULFUR COLLOID 1 DOSE: KIT at 10:25

## 2021-09-24 ENCOUNTER — APPOINTMENT (OUTPATIENT)
Dept: NUCLEAR MEDICINE | Facility: HOSPITAL | Age: 21
End: 2021-09-24

## 2021-10-13 ENCOUNTER — APPOINTMENT (OUTPATIENT)
Dept: NUCLEAR MEDICINE | Facility: HOSPITAL | Age: 21
End: 2021-10-13

## 2021-10-22 ENCOUNTER — HOSPITAL ENCOUNTER (OUTPATIENT)
Dept: NUCLEAR MEDICINE | Facility: HOSPITAL | Age: 21
Discharge: HOME | End: 2021-10-22

## 2021-10-22 DIAGNOSIS — R11.14 BILIOUS VOMITING WITH NAUSEA: ICD-10-CM

## 2021-10-22 PROCEDURE — 78227 HEPATOBIL SYST IMAGE W/DRUG: CPT

## 2021-10-22 PROCEDURE — 0 TECHNETIUM TC 99M MEBROFENIN KIT: Performed by: NURSE PRACTITIONER

## 2021-10-22 PROCEDURE — A9537 TC99M MEBROFENIN: HCPCS | Performed by: NURSE PRACTITIONER

## 2021-10-22 RX ORDER — KIT FOR THE PREPARATION OF TECHNETIUM TC 99M MEBROFENIN 45 MG/10ML
1 INJECTION, POWDER, LYOPHILIZED, FOR SOLUTION INTRAVENOUS
Status: COMPLETED | OUTPATIENT
Start: 2021-10-22 | End: 2021-10-22

## 2021-10-22 RX ADMIN — MEBROFENIN 1 DOSE: 45 INJECTION, POWDER, LYOPHILIZED, FOR SOLUTION INTRAVENOUS at 11:00

## 2021-11-05 ENCOUNTER — OFFICE VISIT (OUTPATIENT)
Dept: GASTROENTEROLOGY | Facility: CLINIC | Age: 21
End: 2021-11-05

## 2021-11-05 VITALS
SYSTOLIC BLOOD PRESSURE: 116 MMHG | BODY MASS INDEX: 23.81 KG/M2 | HEART RATE: 82 BPM | WEIGHT: 129.4 LBS | OXYGEN SATURATION: 98 % | DIASTOLIC BLOOD PRESSURE: 66 MMHG | TEMPERATURE: 98.2 F | HEIGHT: 62 IN

## 2021-11-05 DIAGNOSIS — K30 FUNCTIONAL DYSPEPSIA: ICD-10-CM

## 2021-11-05 DIAGNOSIS — K21.9 GASTROESOPHAGEAL REFLUX DISEASE WITHOUT ESOPHAGITIS: Primary | ICD-10-CM

## 2021-11-05 DIAGNOSIS — K58.0 IRRITABLE BOWEL SYNDROME WITH DIARRHEA: ICD-10-CM

## 2021-11-05 PROCEDURE — 99214 OFFICE O/P EST MOD 30 MIN: CPT | Performed by: NURSE PRACTITIONER

## 2021-11-05 RX ORDER — ERGOCALCIFEROL 1.25 MG/1
CAPSULE ORAL
COMMUNITY
Start: 2021-09-13 | End: 2023-01-26

## 2021-11-05 RX ORDER — AMITRIPTYLINE HYDROCHLORIDE 10 MG/1
10 TABLET, FILM COATED ORAL NIGHTLY
Qty: 30 TABLET | Refills: 5 | Status: SHIPPED | OUTPATIENT
Start: 2021-11-05 | End: 2022-02-04

## 2021-11-05 NOTE — PROGRESS NOTES
"     Follow Up      Patient Name: Yoshi Li  : 2000   MRN: [id number]     Chief Complaint:    Chief Complaint   Patient presents with   • Follow-up     3 mo follow up on GERD       History of Present Illness: Yoshi Li is a 21 y.o. female who is here today for follow up on GERD  Yaya GERD is better controlled on pantoprazole twice daily. She, however, has breakthrough a few days a week and is now only vomiting about once a month.  Her IBS has \"good days and bad days\" . In the past she has tried Xifaxan, Levsin, low FODMAP diet, and probiotic.    NM HIDA Scan With Pharmacological Intervention (10/22/2021 13:21)  NM Gastric Emptying (2021 14:46)  US Abdomen Complete (2019 16:05)    ENDOSCOPY, INT (2019)    Subjective      Review of Systems:   Review of Systems   Constitutional: Positive for fatigue. Negative for appetite change and unexpected weight loss.   HENT: Negative for trouble swallowing.    Gastrointestinal: Positive for constipation, diarrhea, nausea, vomiting, GERD and indigestion. Negative for abdominal distention, abdominal pain, anal bleeding, blood in stool and rectal pain.   Psychiatric/Behavioral: Negative for sleep disturbance.       Medications:     Current Outpatient Medications:   •  Levonorgest-Eth Estrad 91-Day (Seasonique) 0.15-0.03 &0.01 MG tablet, Take 1 tablet by mouth Daily., Disp: 91 each, Rfl: 4  •  pantoprazole (PROTONIX) 20 MG EC tablet, Take 1 tablet by mouth 2 (two) times a day., Disp: 60 tablet, Rfl: 11  •  VITAMIN B1-B12 IM, Inject  into the appropriate muscle as directed by prescriber., Disp: , Rfl:   •  vitamin D (ERGOCALCIFEROL) 1.25 MG (17837 UT) capsule capsule, , Disp: , Rfl:   •  amitriptyline (ELAVIL) 10 MG tablet, Take 1 tablet by mouth Every Night., Disp: 30 tablet, Rfl: 5  •  dicyclomine (BENTYL) 20 MG tablet, Take 1 tablet by mouth 4 (Four) Times a Day As Needed (Abdominal Pain/Cramping)., Disp: 30 tablet, Rfl: " "2    Allergies:   No Known Allergies    Social History:   Social History     Socioeconomic History   • Marital status: Single   Tobacco Use   • Smoking status: Never Smoker   • Smokeless tobacco: Never Used   Vaping Use   • Vaping Use: Never used   Substance and Sexual Activity   • Alcohol use: No   • Drug use: No   • Sexual activity: Not Currently     Birth control/protection: OCP        Surgical History:   Past Surgical History:   Procedure Laterality Date   • ABDOMINAL SURGERY  laparoscopy December 20189   • DIAGNOSTIC LAPAROSCOPY  12/20/2019   • MOUTH SURGERY      wisdom teeth   • UPPER GASTROINTESTINAL ENDOSCOPY  August 2019   • WISDOM TOOTH EXTRACTION          Medical History:   Past Medical History:   Diagnosis Date   • GERD (gastroesophageal reflux disease)    • IBS (irritable bowel syndrome)    • IBS (irritable bowel syndrome)         Objective     Physical Exam:  Vital Signs:   Vitals:    11/05/21 1249   BP: 116/66   BP Location: Left arm   Patient Position: Sitting   Cuff Size: Adult   Pulse: 82   Temp: 98.2 °F (36.8 °C)   TempSrc: Temporal   SpO2: 98%   Weight: 58.7 kg (129 lb 6.4 oz)   Height: 157.5 cm (62.01\")     Body mass index is 23.66 kg/m².     Physical Exam  Vitals and nursing note reviewed.   Constitutional:       General: She is not in acute distress.     Appearance: She is well-developed.   Pulmonary:      Effort: Pulmonary effort is normal. No accessory muscle usage or respiratory distress.   Abdominal:      General: Bowel sounds are normal.      Palpations: Abdomen is soft.   Skin:     Coloration: Skin is not pale.      Findings: No erythema.   Neurological:      Mental Status: She is alert and oriented to person, place, and time.   Psychiatric:         Speech: Speech normal.         Behavior: Behavior normal.         Thought Content: Thought content normal.         Judgment: Judgment normal.         Assessment / Plan      Assessment/Plan:   Diagnoses and all orders for this visit:    1. " Gastroesophageal reflux disease without esophagitis (Primary)  Yoshi has had improvement on twice daily PPI. Believe she has a functional dyspepsia component in addition to reflux.  2. Functional dyspepsia  -     amitriptyline (ELAVIL) 10 MG tablet; Take 1 tablet by mouth Every Night.  Dispense: 30 tablet; Refill: 5  Amitriptyline may help both her functional dyspepsia and irritable bowel syndrome. She will first try FD guard which I have provided samples of. If no improvement she will start low-dose amitriptyline.  3. Irritable bowel syndrome with diarrhea  -     amitriptyline (ELAVIL) 10 MG tablet; Take 1 tablet by mouth Every Night.  Dispense: 30 tablet; Refill: 5           Follow Up:   Return in about 3 months (around 2/5/2022).    Plan of care reviewed with the patient at the conclusion of today's visit.  Education was provided regarding diagnosis, management, and any prescribed or recommended OTC medications.  Patient verbalized understanding of and agreement with management plan.       KENN Damon  [facility] [place] [facility]

## 2021-12-21 DIAGNOSIS — K21.9 CHRONIC GERD: ICD-10-CM

## 2021-12-21 RX ORDER — PANTOPRAZOLE SODIUM 20 MG/1
TABLET, DELAYED RELEASE ORAL
Qty: 60 TABLET | Refills: 11 | Status: SHIPPED | OUTPATIENT
Start: 2021-12-21 | End: 2022-01-07 | Stop reason: SDUPTHER

## 2021-12-22 ENCOUNTER — OFFICE VISIT (OUTPATIENT)
Dept: OBSTETRICS AND GYNECOLOGY | Facility: CLINIC | Age: 21
End: 2021-12-22

## 2021-12-22 VITALS
SYSTOLIC BLOOD PRESSURE: 110 MMHG | RESPIRATION RATE: 16 BRPM | WEIGHT: 130 LBS | BODY MASS INDEX: 23.77 KG/M2 | DIASTOLIC BLOOD PRESSURE: 70 MMHG

## 2021-12-22 DIAGNOSIS — Z30.41 SURVEILLANCE OF PREVIOUSLY PRESCRIBED CONTRACEPTIVE PILL: ICD-10-CM

## 2021-12-22 DIAGNOSIS — Z01.419 ENCOUNTER FOR GYNECOLOGICAL EXAMINATION WITHOUT ABNORMAL FINDING: Primary | ICD-10-CM

## 2021-12-22 DIAGNOSIS — N60.12 FIBROCYSTIC BREAST CHANGES, BILATERAL: ICD-10-CM

## 2021-12-22 DIAGNOSIS — N60.11 FIBROCYSTIC BREAST CHANGES, BILATERAL: ICD-10-CM

## 2021-12-22 PROCEDURE — 99395 PREV VISIT EST AGE 18-39: CPT | Performed by: NURSE PRACTITIONER

## 2021-12-22 RX ORDER — LEVONORGESTREL / ETHINYL ESTRADIOL AND ETHINYL ESTRADIOL 150-30(84)
1 KIT ORAL DAILY
Qty: 91 EACH | Refills: 4 | Status: SHIPPED | OUTPATIENT
Start: 2021-12-22 | End: 2022-12-28 | Stop reason: SDUPTHER

## 2021-12-22 NOTE — PROGRESS NOTES
Annual Visit     Patient Name: Yoshi Li  : 2000   MRN: [id number]   Care Team: Patient Care Team:  Yara Singh MD as PCP - General (Family Medicine)  Ashish Veronica MD (Inactive) as Consulting Physician (Gynecology)  Pramod Simental MD as Consulting Physician (Gastroenterology)  Ana Maria Regan APRN as Nurse Practitioner (Gastroenterology)    Chief Complaint:    Chief Complaint   Patient presents with   • Annual Exam       HPI: Yoshi Li is a 21 y.o. year old  presenting to be seen for her gynecologic exam.   Pap smear  WNL     Changed from orthocyclen last yr to seasonique d/t continued dysmenorrhea   The seasonique has been very helpful for this   She does experience some early withdrawal bldg but that does seem to be lessening with each bldg episode     Hx of IBS - D   Trying Bentyl with GI - just started 3-4 days ago     Hx laparoscopy in Dec 2019 with normal findings     No c/o today     Subjective      /70   Resp 16   Wt 59 kg (130 lb)   LMP 10/22/2021   Breastfeeding No   BMI 23.77 kg/m²     BMI reviewed: Body mass index is 23.77 kg/m².      Objective     Physical Exam    Neuro: alert and oriented to person, place and time   General:  alert; cooperative; well developed; well nourished   Skin:  No suspicious lesions seen   Thyroid: normal to inspection and palpation   Lungs:  breathing is unlabored  clear to auscultation bilaterally   Heart:  regular rate and rhythm, S1, S2 normal, no murmur, click, rub or gallop  normal apical impulse   Breasts:  Examined in supine position  Symmetric without masses or skin dimpling  Nipples normal without inversion, lesions or discharge  There are no palpable axillary nodes  Fibrocystic changes are present both breasts without a discrete mass   Abdomen: soft, non-tender; no masses  no umbilical or inguinal hernias are present  no hepato-splenomegaly   Pelvis: Clinical staff was present for  exam  External genitalia:  normal appearance of the external genitalia including Bartholin's and [place]il[place]'s glands.  :  urethral meatus normal;  Vaginal:  normal pink mucosa without prolapse or lesions.  Cervix:  normal appearance.  Uterus:  normal size, shape and consistency.  Adnexa:  normal bimanual exam of the adnexa.  Rectal:  digital rectal exam not performed; anus visually normal appearing.         Assessment / Plan      Assessment  Problems Addressed This Visit    ICD-10-CM ICD-9-CM   1. Encounter for gynecological examination without abnormal finding  Z01.419 V72.31   2. Encounter for surveillance of contraceptive pills  Z30.41 V25.41   3. Fibrocystic breast changes, bilateral  N60.11 610.1    N60.12    4. Surveillance of previously prescribed contraceptive pill  Z30.41 V25.41       Plan    Pap smear not indicated today   Discussed monthly SBEs and fibrocystic breast changes   No C/Is to cont COCs - reviewed expected bldg pattern with method     AV 1 yr           Follow Up  Return in about 1 year (around 12/22/2022) for Annual physical.  Patient was given instructions and counseling regarding her condition or for health maintenance advice. Please see specific information pulled into the AVS if appropriate.     Marlene Larsen, APRN  December 22, 2021  14:39 EST

## 2022-01-07 DIAGNOSIS — K21.9 CHRONIC GERD: ICD-10-CM

## 2022-01-07 RX ORDER — PANTOPRAZOLE SODIUM 20 MG/1
20 TABLET, DELAYED RELEASE ORAL 2 TIMES DAILY
Qty: 60 TABLET | Refills: 11 | Status: SHIPPED | OUTPATIENT
Start: 2022-01-07 | End: 2022-09-26 | Stop reason: SDUPTHER

## 2022-02-04 ENCOUNTER — TELEMEDICINE (OUTPATIENT)
Dept: GASTROENTEROLOGY | Facility: CLINIC | Age: 22
End: 2022-02-04

## 2022-02-04 DIAGNOSIS — K58.0 IRRITABLE BOWEL SYNDROME WITH DIARRHEA: Primary | ICD-10-CM

## 2022-02-04 DIAGNOSIS — K30 FUNCTIONAL DYSPEPSIA: ICD-10-CM

## 2022-02-04 DIAGNOSIS — K21.9 CHRONIC GERD: ICD-10-CM

## 2022-02-04 PROBLEM — R53.83 FATIGUE: Status: ACTIVE | Noted: 2021-08-04

## 2022-02-04 PROBLEM — K21.00 GASTRO-ESOPHAGEAL REFLUX DISEASE WITH ESOPHAGITIS: Status: ACTIVE | Noted: 2020-11-06

## 2022-02-04 PROBLEM — E55.9 VITAMIN D DEFICIENCY: Status: ACTIVE | Noted: 2021-08-22

## 2022-02-04 PROBLEM — E53.9 VITAMIN B DEFICIENCY: Status: ACTIVE | Noted: 2021-08-04

## 2022-02-04 PROCEDURE — 99214 OFFICE O/P EST MOD 30 MIN: CPT | Performed by: NURSE PRACTITIONER

## 2022-02-04 RX ORDER — BUSPIRONE HYDROCHLORIDE 5 MG/1
5 TABLET ORAL 2 TIMES DAILY
Qty: 60 TABLET | Refills: 0 | Status: SHIPPED | OUTPATIENT
Start: 2022-02-04 | End: 2022-05-16

## 2022-02-04 RX ORDER — BUSPIRONE HYDROCHLORIDE 5 MG/1
5 TABLET ORAL 2 TIMES DAILY
Qty: 180 TABLET | Refills: 0 | Status: SHIPPED | OUTPATIENT
Start: 2022-02-04 | End: 2022-05-16 | Stop reason: SDUPTHER

## 2022-02-04 NOTE — PROGRESS NOTES
Follow Up      Patient Name: Yoshi Li  : 2000   MRN: [id number]     Chief Complaint:    Chief Complaint   Patient presents with   • GI Problem       History of Present Illness: Yoshi Li is a 21 y.o. female who is here today for follow up on GERD and IBS.    Had to discontinue amitriptyline due to tachycardia.  Had trouble remembering to take FD guard and it was also pricey.  She does find benefit from probiotic but often forgets to take this.  Lately her GERD symptoms have been more manageable with dietary adjustments but her IBS has been slightly worse.  More on the IBS-D side.  Has been under more stress lately.  In the past she has tried Xifaxan, Levsin, low FODMAP diet, and probiotic.     NM HIDA Scan With Pharmacological Intervention (10/22/2021 13:21)  NM Gastric Emptying (2021 14:46)  US Abdomen Complete (2019 16:05)     ENDOSCOPY, INT (2019)    Subjective      Review of Systems:   Review of Systems   Constitutional: Positive for fatigue. Negative for appetite change and unexpected weight loss.   HENT: Negative for trouble swallowing.    Gastrointestinal: Positive for abdominal pain, diarrhea and GERD. Negative for abdominal distention, anal bleeding, blood in stool, constipation, nausea, rectal pain, vomiting and indigestion.   Psychiatric/Behavioral: Negative for sleep disturbance.       Medications:     Current Outpatient Medications:   •  busPIRone (BUSPAR) 5 MG tablet, Take 1 tablet by mouth 2 (Two) Times a Day., Disp: 60 tablet, Rfl: 0  •  busPIRone (BUSPAR) 5 MG tablet, Take 1 tablet by mouth 2 (Two) Times a Day., Disp: 180 tablet, Rfl: 0  •  dicyclomine (BENTYL) 20 MG tablet, Take 1 tablet by mouth 4 (Four) Times a Day As Needed (Abdominal Pain/Cramping)., Disp: 30 tablet, Rfl: 2  •  Levonorgest-Eth Estrad 91-Day (Seasonique) 0.15-0.03 &0.01 MG tablet, Take 1 tablet by mouth Daily., Disp: 91 each, Rfl: 4  •  pantoprazole (PROTONIX) 20 MG EC tablet,  Take 1 tablet by mouth 2 (Two) Times a Day., Disp: 60 tablet, Rfl: 11  •  VITAMIN B1-B12 IM, Inject  into the appropriate muscle as directed by prescriber., Disp: , Rfl:   •  vitamin D (ERGOCALCIFEROL) 1.25 MG (81022 UT) capsule capsule, , Disp: , Rfl:     Allergies:   No Known Allergies    Social History:   Social History     Socioeconomic History   • Marital status: Single   Tobacco Use   • Smoking status: Never Smoker   • Smokeless tobacco: Never Used   Vaping Use   • Vaping Use: Never used   Substance and Sexual Activity   • Alcohol use: No   • Drug use: No   • Sexual activity: Not Currently     Birth control/protection: OCP        Surgical History:   Past Surgical History:   Procedure Laterality Date   • ABDOMINAL SURGERY  laparoscopy December 20189   • DIAGNOSTIC LAPAROSCOPY  12/20/2019   • MOUTH SURGERY      wisdom teeth   • UPPER GASTROINTESTINAL ENDOSCOPY  August 2019   • WISDOM TOOTH EXTRACTION          Medical History:   Past Medical History:   Diagnosis Date   • GERD (gastroesophageal reflux disease)    • IBS (irritable bowel syndrome)    • IBS (irritable bowel syndrome)         Objective     Physical Exam:  Vital Signs: There were no vitals filed for this visit.  There is no height or weight on file to calculate BMI.     Physical Exam  Constitutional:       General: She is not in acute distress.     Appearance: She is well-developed.   Pulmonary:      Effort: Pulmonary effort is normal. No accessory muscle usage or respiratory distress.   Skin:     Coloration: Skin is not pale.      Findings: No erythema.   Neurological:      Mental Status: She is alert and oriented to person, place, and time.   Psychiatric:         Speech: Speech normal.         Behavior: Behavior normal.         Thought Content: Thought content normal.         Judgment: Judgment normal.         Assessment / Plan      Assessment/Plan:   Diagnoses and all orders for this visit:    1. Irritable bowel syndrome with diarrhea (Primary)  -      busPIRone (BUSPAR) 5 MG tablet; Take 1 tablet by mouth 2 (Two) Times a Day.  Dispense: 60 tablet; Refill: 0      2. Chronic GERD  Continue with dietary changes and pantoprazole twice daily  3. Functional dyspepsia  -     busPIRone (BUSPAR) 5 MG tablet; Take 1 tablet by mouth 2 (Two) Times a Day.  Dispense: 60 tablet; Refill: 0      We will start BuSpar.  Increase dietary fiber.  Side effects of medication discussed.      Follow Up:   Return in about 3 months (around 5/4/2022).    Plan of care reviewed with the patient at the conclusion of today's visit.  Education was provided regarding diagnosis, management, and any prescribed or recommended OTC medications.  Patient verbalized understanding of and agreement with management plan.         KENN Damon  [facility] [place] [facility]

## 2022-03-31 DIAGNOSIS — L81.9 CHANGING PIGMENTED SKIN LESION: Primary | ICD-10-CM

## 2022-05-16 ENCOUNTER — TELEMEDICINE (OUTPATIENT)
Dept: GASTROENTEROLOGY | Facility: CLINIC | Age: 22
End: 2022-05-16

## 2022-05-16 DIAGNOSIS — K58.0 IRRITABLE BOWEL SYNDROME WITH DIARRHEA: Primary | ICD-10-CM

## 2022-05-16 DIAGNOSIS — K30 FUNCTIONAL DYSPEPSIA: ICD-10-CM

## 2022-05-16 PROCEDURE — 99213 OFFICE O/P EST LOW 20 MIN: CPT | Performed by: NURSE PRACTITIONER

## 2022-05-16 RX ORDER — BUSPIRONE HYDROCHLORIDE 5 MG/1
5 TABLET ORAL 3 TIMES DAILY
Qty: 270 TABLET | Refills: 5 | Status: SHIPPED | OUTPATIENT
Start: 2022-05-16 | End: 2022-09-26 | Stop reason: SDUPTHER

## 2022-05-16 NOTE — PROGRESS NOTES
Follow Up      Patient Name: Yoshi Li  : 2000   MRN: [id number]     Chief Complaint:    Chief Complaint   Patient presents with   • GI Problem       History of Present Illness: Yoshi Li is a 21 y.o. female who is here today for follow up on GERD, IBS    Last visit, Yoshi was started on BuSpar twice daily for her IBS and functional dyspepsia after multiple other treatment failures. Buspar does seem to be helping with general pain and GERD as well. Having about 5-6 Bms a day.     In the past she has tried Xifaxan, Levsin, low FODMAP diet, amitriptyline, FDgard, and probiotic.     NM HIDA Scan With Pharmacological Intervention (10/22/2021 13:21)  NM Gastric Emptying (2021 14:46)  US Abdomen Complete (2019 16:05)     ENDOSCOPY, INT (2019)     Subjective      Review of Systems:   Review of Systems   Constitutional: Positive for fatigue. Negative for appetite change and unexpected weight loss.   HENT: Negative for trouble swallowing.    Gastrointestinal: Positive for abdominal pain, diarrhea and GERD. Negative for abdominal distention, anal bleeding, blood in stool, constipation, nausea, rectal pain, vomiting and indigestion.   Psychiatric/Behavioral: Negative for sleep disturbance.       Medications:     Current Outpatient Medications:   •  busPIRone (BUSPAR) 5 MG tablet, Take 1 tablet by mouth 3 (Three) Times a Day., Disp: 270 tablet, Rfl: 5  •  dicyclomine (BENTYL) 20 MG tablet, Take 1 tablet by mouth 4 (Four) Times a Day As Needed (Abdominal Pain/Cramping)., Disp: 30 tablet, Rfl: 2  •  Levonorgest-Eth Estrad 91-Day (Seasonique) 0.15-0.03 &0.01 MG tablet, Take 1 tablet by mouth Daily., Disp: 91 each, Rfl: 4  •  pantoprazole (PROTONIX) 20 MG EC tablet, Take 1 tablet by mouth 2 (Two) Times a Day., Disp: 60 tablet, Rfl: 11  •  VITAMIN B1-B12 IM, Inject  into the appropriate muscle as directed by prescriber., Disp: , Rfl:   •  vitamin D (ERGOCALCIFEROL) 1.25 MG  (29799 UT) capsule capsule, , Disp: , Rfl:     Allergies:   No Known Allergies    Social History:   Social History     Socioeconomic History   • Marital status: Single   Tobacco Use   • Smoking status: Never Smoker   • Smokeless tobacco: Never Used   Vaping Use   • Vaping Use: Never used   Substance and Sexual Activity   • Alcohol use: No   • Drug use: No   • Sexual activity: Not Currently     Birth control/protection: OCP        Surgical History:   Past Surgical History:   Procedure Laterality Date   • ABDOMINAL SURGERY  laparoscopy December 20189   • DIAGNOSTIC LAPAROSCOPY  12/20/2019   • MOUTH SURGERY      wisdom teeth   • UPPER GASTROINTESTINAL ENDOSCOPY  August 2019   • WISDOM TOOTH EXTRACTION          Medical History:   Past Medical History:   Diagnosis Date   • GERD (gastroesophageal reflux disease)    • IBS (irritable bowel syndrome)    • IBS (irritable bowel syndrome)         Objective     Physical Exam:  Vital Signs: There were no vitals filed for this visit.  There is no height or weight on file to calculate BMI.     Physical Exam  Constitutional:       General: She is not in acute distress.     Appearance: She is well-developed.   Pulmonary:      Effort: Pulmonary effort is normal. No accessory muscle usage or respiratory distress.   Skin:     Coloration: Skin is not pale.      Findings: No erythema.   Neurological:      Mental Status: She is alert and oriented to person, place, and time.   Psychiatric:         Speech: Speech normal.         Behavior: Behavior normal.         Thought Content: Thought content normal.         Judgment: Judgment normal.         Assessment / Plan      Assessment/Plan:   Diagnoses and all orders for this visit:    1. Irritable bowel syndrome with diarrhea (Primary)  -     busPIRone (BUSPAR) 5 MG tablet; Take 1 tablet by mouth 3 (Three) Times a Day.  Dispense: 270 tablet; Refill: 5    2. Functional dyspepsia  -     busPIRone (BUSPAR) 5 MG tablet; Take 1 tablet by mouth 3  (Three) Times a Day.  Dispense: 270 tablet; Refill: 5       Improvement with buspar, will increase to TID. Side effects discussed. Advised if at 4 weeks, still not at goal, may mychart message or call for a dose increase.    Follow Up:   Return in about 3 months (around 8/16/2022), or if symptoms worsen or fail to improve.    Plan of care reviewed with the patient at the conclusion of today's visit.  Education was provided regarding diagnosis, management, and any prescribed or recommended OTC medications.  Patient verbalized understanding of and agreement with management plan.         KENN Damon  [facility] [place] [facility]

## 2022-09-26 ENCOUNTER — TELEMEDICINE (OUTPATIENT)
Dept: GASTROENTEROLOGY | Facility: CLINIC | Age: 22
End: 2022-09-26

## 2022-09-26 DIAGNOSIS — K58.0 IRRITABLE BOWEL SYNDROME WITH DIARRHEA: Primary | ICD-10-CM

## 2022-09-26 DIAGNOSIS — K30 FUNCTIONAL DYSPEPSIA: ICD-10-CM

## 2022-09-26 DIAGNOSIS — K62.5 RECTAL BLEEDING: ICD-10-CM

## 2022-09-26 PROCEDURE — 99214 OFFICE O/P EST MOD 30 MIN: CPT | Performed by: NURSE PRACTITIONER

## 2022-09-26 RX ORDER — BUSPIRONE HYDROCHLORIDE 10 MG/1
10 TABLET ORAL 3 TIMES DAILY
Qty: 90 TABLET | Refills: 2 | Status: SHIPPED | OUTPATIENT
Start: 2022-09-26 | End: 2022-12-14 | Stop reason: SDUPTHER

## 2022-09-26 RX ORDER — PANTOPRAZOLE SODIUM 20 MG/1
20 TABLET, DELAYED RELEASE ORAL 2 TIMES DAILY
Qty: 60 TABLET | Refills: 11 | Status: SHIPPED | OUTPATIENT
Start: 2022-09-26 | End: 2023-01-16 | Stop reason: SDUPTHER

## 2022-09-26 NOTE — PROGRESS NOTES
Follow Up      Patient Name: Yoshi Li  : 2000   MRN: [id number]     Chief Complaint:    Chief Complaint   Patient presents with   • Abdominal Pain       History of Present Illness: Yoshi Li is a 22 y.o. female who is here today for follow up on IBS and functional dyspepsia.    Yoshi remains on BuSpar twice daily for her IBS and functional dyspepsia after multiple other treatment failures. Buspar did seem to be helping with general pain and reflux as well initially but has been having more GI upset the past few months. Having about 3-4 loose bms a day.  Has noticed small amounts of blood in the stool. Having heartburn in the mornings as well.  Appetite is good.  Denies unintentional weight loss.  Cutting out gluten has helped symptoms.      In the past she has tried Xifaxan, Levsin, low FODMAP diet, amitriptyline, FDgard, and probiotic.     NM HIDA Scan With Pharmacological Intervention (10/22/2021 13:21)IMPRESSION:  The gallbladder ejection fraction is within normal limits.  This excludes the possibility of chronic cholecystitis.       NM Gastric Emptying (2021 14:46)   IMPRESSION:  Normal gastric emptying study.  US Abdomen Complete (2019 16:05)   IMPRESSION:  Unremarkable ultrasound of the abdomen.     ENDOSCOPY, INT (2019)- WNL     Subjective      Review of Systems:   Review of Systems   Constitutional: Positive for fatigue. Negative for appetite change and unexpected weight loss.   HENT: Negative for trouble swallowing.    Gastrointestinal: Positive for abdominal pain, diarrhea and GERD. Negative for abdominal distention, anal bleeding, blood in stool, constipation, nausea, rectal pain, vomiting and indigestion.   Psychiatric/Behavioral: Negative for sleep disturbance.       Medications:     Current Outpatient Medications:   •  busPIRone (BUSPAR) 10 MG tablet, Take 1 tablet by mouth 3 (Three) Times a Day., Disp: 90 tablet, Rfl: 2  •  pantoprazole  (PROTONIX) 20 MG EC tablet, Take 1 tablet by mouth 2 (Two) Times a Day., Disp: 60 tablet, Rfl: 11  •  Levonorgest-Eth Estrad 91-Day (Seasonique) 0.15-0.03 &0.01 MG tablet, Take 1 tablet by mouth Daily., Disp: 91 each, Rfl: 4  •  VITAMIN B1-B12 IM, Inject  into the appropriate muscle as directed by prescriber., Disp: , Rfl:   •  vitamin D (ERGOCALCIFEROL) 1.25 MG (56925 UT) capsule capsule, , Disp: , Rfl:     Allergies:   No Known Allergies    Social History:   Social History     Socioeconomic History   • Marital status: Single   Tobacco Use   • Smoking status: Never Smoker   • Smokeless tobacco: Never Used   Vaping Use   • Vaping Use: Never used   Substance and Sexual Activity   • Alcohol use: No   • Drug use: No   • Sexual activity: Not Currently     Birth control/protection: OCP        Surgical History:   Past Surgical History:   Procedure Laterality Date   • ABDOMINAL SURGERY  laparoscopy December 20189   • DIAGNOSTIC LAPAROSCOPY  12/20/2019   • MOUTH SURGERY      wisdom teeth   • UPPER GASTROINTESTINAL ENDOSCOPY  August 2019   • WISDOM TOOTH EXTRACTION          Medical History:   Past Medical History:   Diagnosis Date   • GERD (gastroesophageal reflux disease)    • IBS (irritable bowel syndrome)    • IBS (irritable bowel syndrome)         Objective     Physical Exam:  Vital Signs: There were no vitals filed for this visit.  There is no height or weight on file to calculate BMI.     Physical Exam  Constitutional:       General: She is not in acute distress.     Appearance: She is well-developed.   Pulmonary:      Effort: Pulmonary effort is normal. No accessory muscle usage or respiratory distress.   Skin:     Coloration: Skin is not pale.      Findings: No erythema.   Neurological:      Mental Status: She is alert and oriented to person, place, and time.   Psychiatric:         Speech: Speech normal.         Behavior: Behavior normal.         Thought Content: Thought content normal.         Judgment: Judgment  normal.         Assessment / Plan      Assessment/Plan:   Diagnoses and all orders for this visit:    1. Rectal bleeding (Primary)  -     Ambulatory Referral For Screening Colonoscopy    2. Irritable bowel syndrome with diarrhea  -     Ambulatory Referral to Allergy  -     busPIRone (BUSPAR) 10 MG tablet; Take 1 tablet by mouth 3 (Three) Times a Day.  Dispense: 90 tablet; Refill: 2  I recommend colonoscopy to investigate rectal bleeding.  She initially did well with BuSpar, will increase this to 10 mg.  I do think at this venture, due to several treatment failures, ruling out allergies would be prudent.  3. Functional dyspepsia  -     busPIRone (BUSPAR) 10 MG tablet; Take 1 tablet by mouth 3 (Three) Times a Day.  Dispense: 90 tablet; Refill: 2  -     pantoprazole (PROTONIX) 20 MG EC tablet; Take 1 tablet by mouth 2 (Two) Times a Day.  Dispense: 60 tablet; Refill: 11         Follow Up:   Return in about 8 weeks (around 11/21/2022).    Plan of care reviewed with the patient at the conclusion of today's visit.  Education was provided regarding diagnosis, management, and any prescribed or recommended OTC medications.  Patient verbalized understanding of and agreement with management plan.       KENN Damon  [facility] [place] [facility]

## 2022-10-10 ENCOUNTER — PRIOR AUTHORIZATION (OUTPATIENT)
Dept: GASTROENTEROLOGY | Facility: CLINIC | Age: 22
End: 2022-10-10

## 2022-11-22 RX ORDER — SODIUM, POTASSIUM,MAG SULFATES 17.5-3.13G
1 SOLUTION, RECONSTITUTED, ORAL ORAL EVERY 12 HOURS
Qty: 354 ML | Refills: 0 | Status: SHIPPED | OUTPATIENT
Start: 2022-11-22 | End: 2022-12-14

## 2022-11-30 ENCOUNTER — OUTSIDE FACILITY SERVICE (OUTPATIENT)
Dept: GASTROENTEROLOGY | Facility: CLINIC | Age: 22
End: 2022-11-30

## 2022-11-30 PROCEDURE — 45380 COLONOSCOPY AND BIOPSY: CPT | Performed by: INTERNAL MEDICINE

## 2022-12-01 ENCOUNTER — LAB REQUISITION (OUTPATIENT)
Dept: LAB | Facility: HOSPITAL | Age: 22
End: 2022-12-01

## 2022-12-01 DIAGNOSIS — R19.7 DIARRHEA, UNSPECIFIED: ICD-10-CM

## 2022-12-01 DIAGNOSIS — K64.8 OTHER HEMORRHOIDS: ICD-10-CM

## 2022-12-01 DIAGNOSIS — K62.5 HEMORRHAGE OF ANUS AND RECTUM: ICD-10-CM

## 2022-12-02 LAB — REF LAB TEST METHOD: NORMAL

## 2022-12-05 ENCOUNTER — TELEPHONE (OUTPATIENT)
Dept: GASTROENTEROLOGY | Facility: CLINIC | Age: 22
End: 2022-12-05

## 2022-12-05 NOTE — TELEPHONE ENCOUNTER
I TRIED TO CALL MS MAURER TO GIVE BIOPSY RESULTS. NO ANSWER; LEFT VOICE MESSAGE. I WILL SEND RESULTS TO MY CHART AND THE MAIL.

## 2022-12-05 NOTE — TELEPHONE ENCOUNTER
----- Message from KENN Morrell sent at 12/5/2022  7:33 AM EST -----  Please relay dr resendez result note- thanks

## 2022-12-14 ENCOUNTER — TELEMEDICINE (OUTPATIENT)
Dept: GASTROENTEROLOGY | Facility: CLINIC | Age: 22
End: 2022-12-14

## 2022-12-14 DIAGNOSIS — K64.8 INTERNAL HEMORRHOIDS: ICD-10-CM

## 2022-12-14 DIAGNOSIS — K30 FUNCTIONAL DYSPEPSIA: Primary | ICD-10-CM

## 2022-12-14 DIAGNOSIS — K58.0 IRRITABLE BOWEL SYNDROME WITH DIARRHEA: ICD-10-CM

## 2022-12-14 DIAGNOSIS — R11.14 BILIOUS VOMITING WITH NAUSEA: ICD-10-CM

## 2022-12-14 PROCEDURE — 99214 OFFICE O/P EST MOD 30 MIN: CPT | Performed by: NURSE PRACTITIONER

## 2022-12-14 RX ORDER — BUSPIRONE HYDROCHLORIDE 10 MG/1
10 TABLET ORAL 3 TIMES DAILY
Qty: 90 TABLET | Refills: 2 | Status: SHIPPED | OUTPATIENT
Start: 2022-12-14 | End: 2023-03-14 | Stop reason: SDUPTHER

## 2022-12-14 RX ORDER — ONDANSETRON 4 MG/1
4 TABLET, FILM COATED ORAL EVERY 8 HOURS PRN
Qty: 30 TABLET | Refills: 1 | Status: SHIPPED | OUTPATIENT
Start: 2022-12-14

## 2022-12-14 RX ORDER — HYDROCORTISONE ACETATE 25 MG/1
25 SUPPOSITORY RECTAL 2 TIMES DAILY PRN
Qty: 30 SUPPOSITORY | Refills: 0 | Status: SHIPPED | OUTPATIENT
Start: 2022-12-14

## 2022-12-14 NOTE — PROGRESS NOTES
Follow Up      Patient Name: Emelina Li  : 2000   MRN: [id number]     Chief Complaint:    Chief Complaint   Patient presents with   • GI Problem       History of Present Illness: Emelina Li is a 22 y.o. female who is here today for follow up on IBS, functional dyspepsia.    Last visit, Emelina was referred to an allergist to asses for food allergies.  Her buspar   She underwent a colonoscopy for rectal bleeding.  Currently  Having about 4 bms a day that are soft. Having some rectal bleeding and occasional rectal discomfort. There is regular abdominal cramping. Bentyl helped in the past but caused drowsiness. Levsin was not helpful.     The past few days she has had more stomach upset, bloating, and an episode of vomiting.    She did go to the allergist and found multiple food allergies- is avoiding those food. Buspar seems to help for a while and then looses response.    Appetite is good.  Denies unintentional weight loss.  Cutting out gluten has helped symptoms.      In the past she has tried Xifaxan, Levsin, low FODMAP diet, bentyl, amitriptyline, FDgard, and probiotic.    SCANNED - COLONOSCOPY (2022)- normal colon and TI, non-bleeding internal hemorrhoids. Normal random biopsies      NM HIDA Scan With Pharmacological Intervention (10/22/2021 13:21)IMPRESSION:  The gallbladder ejection fraction is within normal limits.  This excludes the possibility of chronic cholecystitis.       NM Gastric Emptying (2021 14:46)   IMPRESSION:  Normal gastric emptying study.  US Abdomen Complete (2019 16:05)   IMPRESSION:  Unremarkable ultrasound of the abdomen.     ENDOSCOPY, INT (2019)- WNL       Subjective      Review of Systems:   Review of Systems   Constitutional: Positive for fatigue. Negative for appetite change and unexpected weight loss.   HENT: Negative for trouble swallowing.    Gastrointestinal: Positive for abdominal distention, abdominal pain, diarrhea,  nausea, vomiting and GERD. Negative for anal bleeding, blood in stool, constipation, rectal pain and indigestion.   Psychiatric/Behavioral: Negative for sleep disturbance.       Medications:     Current Outpatient Medications:   •  busPIRone (BUSPAR) 10 MG tablet, Take 1 tablet by mouth 3 (Three) Times a Day., Disp: 90 tablet, Rfl: 2  •  hydrocortisone (ANUSOL-HC) 25 MG suppository, Insert 1 suppository into the rectum 2 (Two) Times a Day As Needed for Hemorrhoids., Disp: 30 suppository, Rfl: 0  •  Levonorgest-Eth Estrad 91-Day (Seasonique) 0.15-0.03 &0.01 MG tablet, Take 1 tablet by mouth Daily., Disp: 91 each, Rfl: 4  •  ondansetron (Zofran) 4 MG tablet, Take 1 tablet by mouth Every 8 (Eight) Hours As Needed for Nausea or Vomiting., Disp: 30 tablet, Rfl: 1  •  pantoprazole (PROTONIX) 20 MG EC tablet, Take 1 tablet by mouth 2 (Two) Times a Day., Disp: 60 tablet, Rfl: 11  •  VITAMIN B1-B12 IM, Inject  into the appropriate muscle as directed by prescriber., Disp: , Rfl:   •  vitamin D (ERGOCALCIFEROL) 1.25 MG (35877 UT) capsule capsule, , Disp: , Rfl:     Allergies:   No Known Allergies    Social History:   Social History     Socioeconomic History   • Marital status: Single   Tobacco Use   • Smoking status: Never   • Smokeless tobacco: Never   Vaping Use   • Vaping Use: Never used   Substance and Sexual Activity   • Alcohol use: No   • Drug use: No   • Sexual activity: Not Currently     Birth control/protection: OCP        Surgical History:   Past Surgical History:   Procedure Laterality Date   • ABDOMINAL SURGERY  laparoscopy December 20189   • DIAGNOSTIC LAPAROSCOPY  12/20/2019   • MOUTH SURGERY      wisdom teeth   • UPPER GASTROINTESTINAL ENDOSCOPY  August 2019   • WISDOM TOOTH EXTRACTION          Medical History:   Past Medical History:   Diagnosis Date   • GERD (gastroesophageal reflux disease)    • IBS (irritable bowel syndrome)    • IBS (irritable bowel syndrome)         Objective     Physical Exam:  Vital  Signs: There were no vitals filed for this visit.  There is no height or weight on file to calculate BMI.     Physical Exam  Constitutional:       General: She is not in acute distress.     Appearance: She is well-developed.   Pulmonary:      Effort: Pulmonary effort is normal. No accessory muscle usage or respiratory distress.   Skin:     Coloration: Skin is not pale.      Findings: No erythema.   Neurological:      Mental Status: She is alert and oriented to person, place, and time.   Psychiatric:         Speech: Speech normal.         Behavior: Behavior normal.         Thought Content: Thought content normal.         Judgment: Judgment normal.         Assessment / Plan      Assessment/Plan:   Diagnoses and all orders for this visit:    1. Functional dyspepsia (Primary)  -     busPIRone (BUSPAR) 10 MG tablet; Take 1 tablet by mouth 3 (Three) Times a Day.  Dispense: 90 tablet; Refill: 2    2. Irritable bowel syndrome with diarrhea  -     busPIRone (BUSPAR) 10 MG tablet; Take 1 tablet by mouth 3 (Three) Times a Day.  Dispense: 90 tablet; Refill: 2  -     Clostridioides difficile Toxin, PCR - Stool, Per Rectum; Future  -     Pancreatic Elastase, Fecal - Stool, Per Rectum; Future  -     Calprotectin, Fecal - Stool, Per Rectum; Future  -     Gastrointestinal Panel, PCR - Stool, Per Rectum; Future  -     Ova & Parasite Examination - Stool, Per Rectum; Future    3. Internal hemorrhoids  -     hydrocortisone (ANUSOL-HC) 25 MG suppository; Insert 1 suppository into the rectum 2 (Two) Times a Day As Needed for Hemorrhoids.  Dispense: 30 suppository; Refill: 0    4. Bilious vomiting with nausea  -     ondansetron (Zofran) 4 MG tablet; Take 1 tablet by mouth Every 8 (Eight) Hours As Needed for Nausea or Vomiting.  Dispense: 30 tablet; Refill: 1    Use Anusol suppositories twice a day for 5 to 7 days and as needed.  He Zofran as needed.  Discussed keeping a symptom diary.  Continue to avoid food allergens.  Get updated stool  studies, start Iberogast    Follow Up:   Return in about 3 months (around 3/14/2023).    Plan of care reviewed with the patient at the conclusion of today's visit.  Education was provided regarding diagnosis, management, and any prescribed or recommended OTC medications.  Patient verbalized understanding of and agreement with management plan.         KENN Damon  [facility] [place] [facility]

## 2022-12-28 DIAGNOSIS — Z30.41 SURVEILLANCE OF PREVIOUSLY PRESCRIBED CONTRACEPTIVE PILL: ICD-10-CM

## 2022-12-28 RX ORDER — LEVONORGESTREL / ETHINYL ESTRADIOL AND ETHINYL ESTRADIOL 150-30(84)
1 KIT ORAL DAILY
Qty: 91 EACH | Refills: 0 | Status: SHIPPED | OUTPATIENT
Start: 2022-12-28 | End: 2023-01-26 | Stop reason: SDUPTHER

## 2022-12-28 NOTE — TELEPHONE ENCOUNTER
Caller: CAMERON MAURER    Relationship: SELF    Best call back number: [phone]    Requested Prescriptions:   Requested Prescriptions     Pending Prescriptions Disp Refills   • Levonorgest-Eth Estrad 91-Day (Seasonique) 0.15-0.03 &0.01 MG tablet 91 each 4     Sig: Take 1 tablet by mouth Daily.        Pharmacy where request should be sent: [id number] [place], [place] - [address] [facility] [address] [facility] [facility] [address] [facility] [phone] [facility] [phone] FX     Additional details provided by patient:  PT NEEDS REFILL OF BIRTH CONTROL PILLS (30 DAY SUPPLY ) TO GET HER TO ANNUAL APPT  Does the patient have less than a 3 day supply:  [] Yes  [x] No    Would you like a call back once the refill request has been completed: [x] Yes [] No    If the office needs to give you a call back, can they leave a voicemail: [x] Yes [] No    Samina Bella Rep   12/28/22 11:02 EST

## 2023-01-16 DIAGNOSIS — K30 FUNCTIONAL DYSPEPSIA: ICD-10-CM

## 2023-01-16 RX ORDER — PANTOPRAZOLE SODIUM 20 MG/1
20 TABLET, DELAYED RELEASE ORAL 2 TIMES DAILY
Qty: 180 TABLET | Refills: 3 | Status: SHIPPED | OUTPATIENT
Start: 2023-01-16

## 2023-01-26 ENCOUNTER — OFFICE VISIT (OUTPATIENT)
Dept: OBSTETRICS AND GYNECOLOGY | Facility: CLINIC | Age: 23
End: 2023-01-26
Payer: COMMERCIAL

## 2023-01-26 VITALS
DIASTOLIC BLOOD PRESSURE: 70 MMHG | SYSTOLIC BLOOD PRESSURE: 118 MMHG | WEIGHT: 122 LBS | RESPIRATION RATE: 16 BRPM | BODY MASS INDEX: 22.31 KG/M2

## 2023-01-26 DIAGNOSIS — N60.12 FIBROCYSTIC BREAST CHANGES, BILATERAL: ICD-10-CM

## 2023-01-26 DIAGNOSIS — Z30.41 SURVEILLANCE OF PREVIOUSLY PRESCRIBED CONTRACEPTIVE PILL: ICD-10-CM

## 2023-01-26 DIAGNOSIS — Z01.419 ENCOUNTER FOR GYNECOLOGICAL EXAMINATION WITHOUT ABNORMAL FINDING: Primary | ICD-10-CM

## 2023-01-26 DIAGNOSIS — N60.11 FIBROCYSTIC BREAST CHANGES, BILATERAL: ICD-10-CM

## 2023-01-26 PROCEDURE — 99395 PREV VISIT EST AGE 18-39: CPT | Performed by: NURSE PRACTITIONER

## 2023-01-26 RX ORDER — CETIRIZINE HYDROCHLORIDE 10 MG/1
10 TABLET ORAL DAILY
COMMUNITY
Start: 2022-11-18

## 2023-01-26 RX ORDER — FLUTICASONE PROPIONATE 50 MCG
2 SPRAY, SUSPENSION (ML) NASAL DAILY
COMMUNITY
Start: 2022-11-18

## 2023-01-26 RX ORDER — FLUOROMETHOLONE 0.1 %
SUSPENSION, DROPS(FINAL DOSAGE FORM)(ML) OPHTHALMIC (EYE)
COMMUNITY
Start: 2022-12-12 | End: 2023-03-14

## 2023-01-26 RX ORDER — LEVONORGESTREL / ETHINYL ESTRADIOL AND ETHINYL ESTRADIOL 150-30(84)
1 KIT ORAL DAILY
Qty: 91 EACH | Refills: 3 | Status: SHIPPED | OUTPATIENT
Start: 2023-01-26 | End: 2023-04-06 | Stop reason: SDUPTHER

## 2023-01-26 NOTE — PROGRESS NOTES
Annual Visit     Patient Name: Yoshi Li  : 2000   MRN: [id number]   Care Team: Patient Care Team:  Yara Singh MD as PCP - General (Family Medicine)  Pramod Simental MD as Consulting Physician (Gastroenterology)  Ana Maria Regan APRN as Nurse Practitioner (Gastroenterology)  Marlene Larsen APRN as Nurse Practitioner (Nurse Practitioner)    Chief Complaint:    Chief Complaint   Patient presents with   • Annual Exam       HPI: Yoshi Li is a 22 y.o. year old  presenting to be seen for her gynecologic exam.   Pap smear 2020 WNL      Changed from orthocyclen to seasonique d/t continued dysmenorrhea two yrs ago now   The seasonique has been very helpful for this   She does experience some early withdrawal bldg sometimes the week before inactive pills but this is not bothersome - overall she is very pleased with this pill      Hx of IBS - D and GERD   Seeing GI and taking medications but they haven't been overly helpful so far   Colonoscopy 2022      Hx laparoscopy in Dec 2019 with normal findings     No new partners in the last yr - declines STI screening      Completed her bachelors degree in Medical science in May - working at [employer] currently - considering medical school        Subjective      I have reviewed the patients family history, social history, past medical history, past surgical history and have updated it as appropriate.    /70   Resp 16   Wt 55.3 kg (122 lb)   LMP 2022   BMI 22.31 kg/m²     BMI reviewed: Body mass index is 22.31 kg/m².      Objective     Physical Exam    Neuro: alert and oriented to person, place and time   General:  alert; cooperative; well developed; well nourished   Skin:  No suspicious lesions seen   Thyroid: normal to inspection and palpation   Lungs:  breathing is unlabored  clear to auscultation bilaterally   Heart:  regular rate and rhythm, S1, S2 normal, no murmur, click, rub or gallop  normal  apical impulse   Breasts:  Examined in supine position  Symmetric without masses or skin dimpling  Nipples normal without inversion, lesions or discharge  There are no palpable axillary nodes  Fibrocystic changes are present both breasts without a discrete mass   Abdomen: soft, non-tender; no masses  no umbilical or inguinal hernias are present  no hepato-splenomegaly   Pelvis: Clinical staff was present for exam  External genitalia:  normal appearance of the external genitalia including Bartholin's and Winooski's glands.  :  urethral meatus normal;  Vaginal:  normal pink mucosa without prolapse or lesions.  Cervix:  normal appearance.  Uterus:  normal size, shape and consistency.  Adnexa:  normal bimanual exam of the adnexa.  Rectal:  digital rectal exam not performed; anus visually normal appearing.         Assessment / Plan      Assessment  Problems Addressed This Visit    ICD-10-CM ICD-9-CM   1. Encounter for gynecological examination without abnormal finding  Z01.419 V72.31   2. Surveillance of previously prescribed contraceptive pill  Z30.41 V25.41   3. Fibrocystic breast changes, bilateral  N60.11 610.1    N60.12        Plan    Pap smear not indicated today   Discussed monthly SBEs and fibrocystic breast changes   No C/Is to cont COCs - reviewed expected bldg pattern with method      AV 1 yr       19 to 39: Counseling/Anticipatory Guidance Discussed: family planning/contraception, sexual behavior and STDs and breast cancer and self breast exams    Follow Up  Return in about 1 year (around 1/26/2024) for Annual physical.  Patient was given instructions and counseling regarding her condition or for health maintenance advice. Please see specific information pulled into the AVS if appropriate.     Marlene Larsen, APRN  January 26, 2023  15:27 EST

## 2023-03-14 ENCOUNTER — TELEMEDICINE (OUTPATIENT)
Dept: GASTROENTEROLOGY | Facility: CLINIC | Age: 23
End: 2023-03-14
Payer: COMMERCIAL

## 2023-03-14 DIAGNOSIS — K58.0 IRRITABLE BOWEL SYNDROME WITH DIARRHEA: Primary | ICD-10-CM

## 2023-03-14 DIAGNOSIS — K30 FUNCTIONAL DYSPEPSIA: ICD-10-CM

## 2023-03-14 PROCEDURE — 99214 OFFICE O/P EST MOD 30 MIN: CPT | Performed by: NURSE PRACTITIONER

## 2023-03-14 RX ORDER — BUSPIRONE HYDROCHLORIDE 10 MG/1
10 TABLET ORAL 3 TIMES DAILY
Qty: 180 TABLET | Refills: 3 | Status: SHIPPED | OUTPATIENT
Start: 2023-03-14

## 2023-03-14 NOTE — PROGRESS NOTES
Follow Up      Patient Name: Yoshi Li  : 2000   MRN: [id number]     Chief Complaint:    Chief Complaint   Patient presents with   • Abdominal Pain       History of Present Illness: Yoshi Li is a 22 y.o. female who is here today for follow up on IBS and functional dyspepsia    BuSpar dose increased last visit.  She was given Anusol suppositories for rectal pain. Currently doing well over all with her abdominal pain.  Takes mylanta as needed. Having a bm about 3-4 times a day that are normal and formed.     She did go to the allergist and found multiple food allergies- is avoiding those food.     In the past she has tried Xifaxan, Levsin, low FODMAP diet, bentyl, amitriptyline, FDgard, and probiotic.     SCANNED - COLONOSCOPY (2022)- normal colon and TI, non-bleeding internal hemorrhoids. Normal random biopsies        NM HIDA Scan With Pharmacological Intervention (10/22/2021 13:21)IMPRESSION:  The gallbladder ejection fraction is within normal limits.  This excludes the possibility of chronic cholecystitis.        NM Gastric Emptying (2021 14:46)   IMPRESSION:  Normal gastric emptying study.  US Abdomen Complete (2019 16:05)   IMPRESSION:  Unremarkable ultrasound of the abdomen.     ENDOSCOPY, INT (2019)- WNL      Subjective      Review of Systems:   Review of Systems   Constitutional: Negative for appetite change and unexpected weight loss.   HENT: Negative for trouble swallowing.    Gastrointestinal: Positive for GERD. Negative for abdominal distention, abdominal pain, anal bleeding, blood in stool, constipation, diarrhea, nausea, rectal pain, vomiting and indigestion.       Medications:     Current Outpatient Medications:   •  busPIRone (BUSPAR) 10 MG tablet, Take 1 tablet by mouth 3 (Three) Times a Day., Disp: 180 tablet, Rfl: 3  •  cetirizine (zyrTEC) 10 MG tablet, Take 10 mg by mouth Daily., Disp: , Rfl:   •  fluticasone (FLONASE) 50 MCG/ACT nasal  spray, 2 sprays by Each Nare route Daily. Shake liquid, Disp: , Rfl:   •  hydrocortisone (ANUSOL-HC) 25 MG suppository, Insert 1 suppository into the rectum 2 (Two) Times a Day As Needed for Hemorrhoids., Disp: 30 suppository, Rfl: 0  •  Levonorgest-Eth Estrad 91-Day (Seasonique) 0.15-0.03 &0.01 MG tablet, Take 1 tablet by mouth Daily., Disp: 91 each, Rfl: 3  •  ondansetron (Zofran) 4 MG tablet, Take 1 tablet by mouth Every 8 (Eight) Hours As Needed for Nausea or Vomiting., Disp: 30 tablet, Rfl: 1  •  pantoprazole (PROTONIX) 20 MG EC tablet, Take 1 tablet by mouth 2 (Two) Times a Day., Disp: 180 tablet, Rfl: 3  •  VITAMIN B1-B12 IM, Inject  into the appropriate muscle as directed by prescriber., Disp: , Rfl:     Allergies:   No Known Allergies    Social History:   Social History     Socioeconomic History   • Marital status: Single   Tobacco Use   • Smoking status: Never   • Smokeless tobacco: Never   Vaping Use   • Vaping Use: Never used   Substance and Sexual Activity   • Alcohol use: No   • Drug use: No   • Sexual activity: Not Currently     Birth control/protection: OCP        Surgical History:   Past Surgical History:   Procedure Laterality Date   • ABDOMINAL SURGERY  laparoscopy December 20189   • DIAGNOSTIC LAPAROSCOPY  12/20/2019   • MOUTH SURGERY      wisdom teeth   • UPPER GASTROINTESTINAL ENDOSCOPY  August 2019   • WISDOM TOOTH EXTRACTION          Medical History:   Past Medical History:   Diagnosis Date   • GERD (gastroesophageal reflux disease)    • IBS (irritable bowel syndrome)    • IBS (irritable bowel syndrome)         Objective     Physical Exam:  Vital Signs: There were no vitals filed for this visit.  There is no height or weight on file to calculate BMI.     Physical Exam  Constitutional:       General: She is not in acute distress.     Appearance: She is well-developed.   Pulmonary:      Effort: Pulmonary effort is normal. No accessory muscle usage or respiratory distress.   Skin:      Coloration: Skin is not pale.      Findings: No erythema.   Neurological:      Mental Status: She is alert and oriented to person, place, and time.   Psychiatric:         Speech: Speech normal.         Behavior: Behavior normal.         Thought Content: Thought content normal.         Judgment: Judgment normal.         Assessment / Plan      Assessment/Plan:   Diagnoses and all orders for this visit:    1. Irritable bowel syndrome with diarrhea (Primary)  -     busPIRone (BUSPAR) 10 MG tablet; Take 1 tablet by mouth 3 (Three) Times a Day.  Dispense: 180 tablet; Refill: 3    2. Functional dyspepsia  -     busPIRone (BUSPAR) 10 MG tablet; Take 1 tablet by mouth 3 (Three) Times a Day.  Dispense: 180 tablet; Refill: 3    Doing really well on this current medication for both her upper and lower digestive symptoms.  Continue current treatment plan as well as allergy avoidance and using Mylanta as needed    Follow Up:   Return in about 6 months (around 9/14/2023).    Plan of care reviewed with the patient at the conclusion of today's visit.  Education was provided regarding diagnosis, management, and any prescribed or recommended OTC medications.  Patient verbalized understanding of and agreement with management plan.     KENN Damon  [facility] [place] [facility]

## 2023-04-06 ENCOUNTER — TELEPHONE (OUTPATIENT)
Dept: OBSTETRICS AND GYNECOLOGY | Facility: CLINIC | Age: 23
End: 2023-04-06
Payer: COMMERCIAL

## 2023-04-06 DIAGNOSIS — Z30.41 SURVEILLANCE OF PREVIOUSLY PRESCRIBED CONTRACEPTIVE PILL: ICD-10-CM

## 2023-04-06 RX ORDER — LEVONORGESTREL / ETHINYL ESTRADIOL AND ETHINYL ESTRADIOL 150-30(84)
1 KIT ORAL DAILY
Qty: 91 EACH | Refills: 3 | Status: SHIPPED | OUTPATIENT
Start: 2023-04-06 | End: 2024-04-05

## 2023-09-01 ENCOUNTER — TELEPHONE (OUTPATIENT)
Dept: GASTROENTEROLOGY | Facility: CLINIC | Age: 23
End: 2023-09-01
Payer: COMMERCIAL

## 2023-09-01 DIAGNOSIS — K58.0 IRRITABLE BOWEL SYNDROME WITH DIARRHEA: ICD-10-CM

## 2023-09-01 DIAGNOSIS — K30 FUNCTIONAL DYSPEPSIA: ICD-10-CM

## 2023-09-01 NOTE — TELEPHONE ENCOUNTER
Patient calling as she is out of prescriptions needing refill:  - Out of Buspar (has a few doses left)  - Will run out of pantoprazole before scheduled follow up in December.

## 2023-09-05 RX ORDER — PANTOPRAZOLE SODIUM 20 MG/1
20 TABLET, DELAYED RELEASE ORAL 2 TIMES DAILY
Qty: 180 TABLET | Refills: 3 | Status: SHIPPED | OUTPATIENT
Start: 2023-09-05

## 2023-09-05 RX ORDER — BUSPIRONE HYDROCHLORIDE 10 MG/1
10 TABLET ORAL 3 TIMES DAILY
Qty: 180 TABLET | Refills: 3 | Status: SHIPPED | OUTPATIENT
Start: 2023-09-05

## 2023-12-06 ENCOUNTER — TELEMEDICINE (OUTPATIENT)
Dept: GASTROENTEROLOGY | Facility: CLINIC | Age: 23
End: 2023-12-06
Payer: COMMERCIAL

## 2023-12-06 DIAGNOSIS — K58.0 IRRITABLE BOWEL SYNDROME WITH DIARRHEA: ICD-10-CM

## 2023-12-06 DIAGNOSIS — K30 FUNCTIONAL DYSPEPSIA: Primary | ICD-10-CM

## 2023-12-06 PROCEDURE — 99214 OFFICE O/P EST MOD 30 MIN: CPT | Performed by: NURSE PRACTITIONER

## 2023-12-06 RX ORDER — PANTOPRAZOLE SODIUM 20 MG/1
20 TABLET, DELAYED RELEASE ORAL 2 TIMES DAILY
Qty: 180 TABLET | Refills: 3 | Status: SHIPPED | OUTPATIENT
Start: 2023-12-06

## 2023-12-06 RX ORDER — LEVOCETIRIZINE DIHYDROCHLORIDE 5 MG/1
5 TABLET, FILM COATED ORAL EVERY MORNING
COMMUNITY
Start: 2023-09-18

## 2023-12-06 RX ORDER — BUSPIRONE HYDROCHLORIDE 10 MG/1
10 TABLET ORAL 3 TIMES DAILY
Qty: 180 TABLET | Refills: 3 | Status: SHIPPED | OUTPATIENT
Start: 2023-12-06

## 2023-12-06 RX ORDER — FAMOTIDINE 40 MG/1
40 TABLET, FILM COATED ORAL NIGHTLY PRN
Qty: 30 TABLET | Refills: 11 | Status: SHIPPED | OUTPATIENT
Start: 2023-12-06

## 2023-12-06 NOTE — PROGRESS NOTES
Follow Up      Patient Name: Yoshi Li  : 2000   MRN: [id number]     Chief Complaint:    Chief Complaint   Patient presents with    GI Problem       History of Present Illness: Yoshi Li is a 23 y.o. female who is here today for follow up on IBS, functional dyspepsia    Yoshi has been doing well since our last visit.  Her symptoms are manageable and mild with buspar BID. She sometimes needs to take a third dose.   Her reflux is controlled on pantoprazole 20 BID. She would like to try to come down or off of this is possible.  Uses mylanta as needed.        She did go to the allergist and found multiple food allergies- is avoiding those food.      In the past she has tried Xifaxan, Levsin, low FODMAP diet, bentyl, amitriptyline, FDgard, and probiotic.     SCANNED - COLONOSCOPY (2022)- normal colon and TI, non-bleeding internal hemorrhoids. Normal random biopsies     NM HIDA Scan With Pharmacological Intervention (10/22/2021 13:21)- normal    NM Gastric Emptying (2021 14:46) - normal    US Abdomen Complete (2019 16:05) - unremarkable     ENDOSCOPY, INT (2019)- WNL       Subjective      Review of Systems:   Review of Systems   Constitutional:  Negative for appetite change and unexpected weight loss.   HENT:  Negative for trouble swallowing.    Gastrointestinal:  Positive for diarrhea. Negative for abdominal distention, abdominal pain, anal bleeding, blood in stool, constipation, nausea, rectal pain, vomiting, GERD and indigestion.       Medications:     Current Outpatient Medications:     busPIRone (BUSPAR) 10 MG tablet, Take 1 tablet by mouth 3 (Three) Times a Day., Disp: 180 tablet, Rfl: 3    levocetirizine (XYZAL) 5 MG tablet, Take 1 tablet by mouth Every Morning., Disp: , Rfl:     pantoprazole (PROTONIX) 20 MG EC tablet, Take 1 tablet by mouth 2 (Two) Times a Day., Disp: 180 tablet, Rfl: 3    cetirizine (zyrTEC) 10 MG tablet, Take 10 mg by mouth Daily.,  Disp: , Rfl:     famotidine (Pepcid) 40 MG tablet, Take 1 tablet by mouth At Night As Needed for Heartburn., Disp: 30 tablet, Rfl: 11    fluticasone (FLONASE) 50 MCG/ACT nasal spray, 2 sprays by Each Nare route Daily. Shake liquid, Disp: , Rfl:     hydrocortisone (ANUSOL-HC) 25 MG suppository, Insert 1 suppository into the rectum 2 (Two) Times a Day As Needed for Hemorrhoids., Disp: 30 suppository, Rfl: 0    Levonorgest-Eth Estrad 91-Day (Seasonique) 0.15-0.03 &0.01 MG tablet, Take 1 tablet by mouth Daily., Disp: 91 each, Rfl: 3    ondansetron (Zofran) 4 MG tablet, Take 1 tablet by mouth Every 8 (Eight) Hours As Needed for Nausea or Vomiting., Disp: 30 tablet, Rfl: 1    VITAMIN B1-B12 IM, Inject  into the appropriate muscle as directed by prescriber., Disp: , Rfl:     Allergies:   No Known Allergies    Social History:   Social History     Socioeconomic History    Marital status: Single   Tobacco Use    Smoking status: Never    Smokeless tobacco: Never   Vaping Use    Vaping Use: Never used   Substance and Sexual Activity    Alcohol use: No    Drug use: No    Sexual activity: Not Currently     Birth control/protection: OCP        Surgical History:   Past Surgical History:   Procedure Laterality Date    ABDOMINAL SURGERY  laparoscopy December 20189    DIAGNOSTIC LAPAROSCOPY  12/20/2019    MOUTH SURGERY      wisdom teeth    UPPER GASTROINTESTINAL ENDOSCOPY  August 2019    WISDOM TOOTH EXTRACTION          Medical History:   Past Medical History:   Diagnosis Date    GERD (gastroesophageal reflux disease)     IBS (irritable bowel syndrome)     IBS (irritable bowel syndrome)         Objective     Physical Exam:  Vital Signs: There were no vitals filed for this visit.  There is no height or weight on file to calculate BMI.     Physical Exam  Constitutional:       General: She is not in acute distress.     Appearance: She is well-developed.   Pulmonary:      Effort: Pulmonary effort is normal. No accessory muscle usage or  respiratory distress.   Skin:     Coloration: Skin is not pale.      Findings: No erythema.   Neurological:      Mental Status: She is alert and oriented to person, place, and time.   Psychiatric:         Speech: Speech normal.         Behavior: Behavior normal.         Thought Content: Thought content normal.         Judgment: Judgment normal.         Assessment / Plan      Assessment/Plan:   Diagnoses and all orders for this visit:    1. Functional dyspepsia (Primary)  -     famotidine (Pepcid) 40 MG tablet; Take 1 tablet by mouth At Night As Needed for Heartburn.  Dispense: 30 tablet; Refill: 11  -     busPIRone (BUSPAR) 10 MG tablet; Take 1 tablet by mouth 3 (Three) Times a Day.  Dispense: 180 tablet; Refill: 3  -     pantoprazole (PROTONIX) 20 MG EC tablet; Take 1 tablet by mouth 2 (Two) Times a Day.  Dispense: 180 tablet; Refill: 3    2. Irritable bowel syndrome with diarrhea  -     busPIRone (BUSPAR) 10 MG tablet; Take 1 tablet by mouth 3 (Three) Times a Day.  Dispense: 180 tablet; Refill: 3    Long term side effects of PPI discussed.  Will try to wean down on her pantoprazole, weaning schedule was given.  She may sub with pepcid.  Continue buspar for IBS/functional dyspepsia.     Follow Up:   Return in about 6 months (around 6/6/2024).    Plan of care reviewed with the patient at the conclusion of today's visit.  Education was provided regarding diagnosis, management, and any prescribed or recommended OTC medications.  Patient verbalized understanding of and agreement with management plan.       KENN Damon  [facility]

## 2024-02-23 ENCOUNTER — TELEPHONE (OUTPATIENT)
Dept: OBSTETRICS AND GYNECOLOGY | Facility: CLINIC | Age: 24
End: 2024-02-23
Payer: COMMERCIAL

## 2024-02-23 DIAGNOSIS — Z30.41 SURVEILLANCE OF PREVIOUSLY PRESCRIBED CONTRACEPTIVE PILL: ICD-10-CM

## 2024-02-23 RX ORDER — LEVONORGESTREL / ETHINYL ESTRADIOL AND ETHINYL ESTRADIOL 150-30(84)
1 KIT ORAL DAILY
Qty: 91 EACH | Refills: 1 | Status: SHIPPED | OUTPATIENT
Start: 2024-02-23 | End: 2025-02-22

## 2024-02-23 NOTE — TELEPHONE ENCOUNTER
Caller: Yoshi Li    Relationship: Self    Best call back number: [phone]    Requested Prescriptions:     Levonorgest-Eth Estrad 91-Day (Seasonique) 0.15-0.03 &0.01 MG tablet [[id number]]       Requested Prescriptions      No prescriptions requested or ordered in this encounter        Pharmacy where request should be sent:  [facility] [place], [place]    Last office visit with prescribing clinician: Visit date not found   Last telemedicine visit with prescribing clinician: Visit date not found   Next office visit with prescribing clinician: Visit date not found     Additional details provided by patient: PT WAS FORMER [facility] - HAS APPT SCHEDULED FOR ANNUAL IN JUNE NEEDS TO HAVE B/C REFILLED TO GET HER THRU    Does the patient have less than a 3 day supply:  [] Yes  [x] No    Would you like a call back once the refill request has been completed: [x] Yes [] No    If the office needs to give you a call back, can they leave a voicemail: [x] Yes [] No    Samina Bassett Rep   02/23/24 12:18 EST

## 2024-06-06 ENCOUNTER — TELEMEDICINE (OUTPATIENT)
Dept: GASTROENTEROLOGY | Facility: CLINIC | Age: 24
End: 2024-06-06
Payer: COMMERCIAL

## 2024-06-06 DIAGNOSIS — R11.14 BILIOUS VOMITING WITH NAUSEA: ICD-10-CM

## 2024-06-06 DIAGNOSIS — K58.0 IRRITABLE BOWEL SYNDROME WITH DIARRHEA: ICD-10-CM

## 2024-06-06 DIAGNOSIS — K30 FUNCTIONAL DYSPEPSIA: Primary | ICD-10-CM

## 2024-06-06 PROCEDURE — 99214 OFFICE O/P EST MOD 30 MIN: CPT | Performed by: NURSE PRACTITIONER

## 2024-06-06 RX ORDER — BUSPIRONE HYDROCHLORIDE 10 MG/1
10 TABLET ORAL 3 TIMES DAILY
Qty: 180 TABLET | Refills: 3 | Status: SHIPPED | OUTPATIENT
Start: 2024-06-06

## 2024-06-06 RX ORDER — PANTOPRAZOLE SODIUM 20 MG/1
20 TABLET, DELAYED RELEASE ORAL 2 TIMES DAILY
Qty: 180 TABLET | Refills: 3 | Status: SHIPPED | OUTPATIENT
Start: 2024-06-06

## 2024-06-06 RX ORDER — ONDANSETRON 4 MG/1
4 TABLET, FILM COATED ORAL EVERY 8 HOURS PRN
Qty: 30 TABLET | Refills: 5 | Status: SHIPPED | OUTPATIENT
Start: 2024-06-06

## 2024-06-06 NOTE — PROGRESS NOTES
Follow Up      Patient Name: Yoshi Li  : 2000   MRN: [id number]     Chief Complaint:    Chief Complaint   Patient presents with    GI Problem       History of Present Illness: Yoshi Li is a 23 y.o. female who is here today for follow up on IBS, functional dyspepsia     Yoshi has been doing well since our last visit.  Her symptoms are manageable with buspar TID. She often forgets the middles dose.  Her reflux is controlled on pantoprazole 20 BID. She would like to try to come down or off of this is possible.  Uses mylanta as needed.      She did go to the allergist and found multiple food allergies- is avoiding those food.      In the past she has tried Xifaxan, Levsin, low FODMAP diet, bentyl, amitriptyline, FDgard, and probiotic.     SCANNED - COLONOSCOPY (2022)- (Dr Simental)- normal colon and TI, non-bleeding internal hemorrhoids. Normal random biopsies     NM HIDA Scan With Pharmacological Intervention (10/22/2021 13:21)- normal     NM Gastric Emptying (2021 14:46) - normal     US Abdomen Complete (2019 16:05) - unremarkable     ENDOSCOPY, INT (2019)-(Dr Simental)- WNL     Abdominal surgical history of diagnostic laparoscopy    Subjective      Review of Systems:   Review of Systems   Constitutional:  Negative for appetite change and unexpected weight loss.   HENT:  Negative for trouble swallowing.    Gastrointestinal:  Positive for diarrhea. Negative for abdominal distention, abdominal pain, anal bleeding, blood in stool, constipation, nausea, rectal pain, vomiting, GERD and indigestion.       Medications:     Current Outpatient Medications:     busPIRone (BUSPAR) 10 MG tablet, Take 1 tablet by mouth 3 (Three) Times a Day., Disp: 180 tablet, Rfl: 3    ondansetron (Zofran) 4 MG tablet, Take 1 tablet by mouth Every 8 (Eight) Hours As Needed for Nausea or Vomiting., Disp: 30 tablet, Rfl: 5    pantoprazole (PROTONIX) 20 MG EC tablet, Take 1 tablet by  mouth 2 (Two) Times a Day., Disp: 180 tablet, Rfl: 3    cetirizine (zyrTEC) 10 MG tablet, Take 10 mg by mouth Daily., Disp: , Rfl:     famotidine (Pepcid) 40 MG tablet, Take 1 tablet by mouth At Night As Needed for Heartburn., Disp: 30 tablet, Rfl: 11    fluticasone (FLONASE) 50 MCG/ACT nasal spray, 2 sprays by Each Nare route Daily. Shake liquid, Disp: , Rfl:     hydrocortisone (ANUSOL-HC) 25 MG suppository, Insert 1 suppository into the rectum 2 (Two) Times a Day As Needed for Hemorrhoids., Disp: 30 suppository, Rfl: 0    levocetirizine (XYZAL) 5 MG tablet, Take 1 tablet by mouth Every Morning., Disp: , Rfl:     Levonorgest-Eth Estrad 91-Day (Seasonique) 0.15-0.03 &0.01 MG, Take 1 tablet by mouth Daily., Disp: 91 each, Rfl: 1    VITAMIN B1-B12 IM, Inject  into the appropriate muscle as directed by prescriber., Disp: , Rfl:     Allergies:   No Known Allergies    Social History:   Social History     Socioeconomic History    Marital status: Single   Tobacco Use    Smoking status: Never    Smokeless tobacco: Never   Vaping Use    Vaping status: Never Used   Substance and Sexual Activity    Alcohol use: No    Drug use: No    Sexual activity: Not Currently     Birth control/protection: OCP        Surgical History:   Past Surgical History:   Procedure Laterality Date    ABDOMINAL SURGERY  laparoscopy December 20189    DIAGNOSTIC LAPAROSCOPY  12/20/2019    MOUTH SURGERY      wisdom teeth    UPPER GASTROINTESTINAL ENDOSCOPY  August 2019    WISDOM TOOTH EXTRACTION          Medical History:   Past Medical History:   Diagnosis Date    GERD (gastroesophageal reflux disease)     IBS (irritable bowel syndrome)     IBS (irritable bowel syndrome)         Objective     Physical Exam:  Vital Signs: There were no vitals filed for this visit.  There is no height or weight on file to calculate BMI.     Physical Exam  Constitutional:       General: She is not in acute distress.     Appearance: She is well-developed.   Pulmonary:       Effort: Pulmonary effort is normal. No accessory muscle usage or respiratory distress.   Skin:     Coloration: Skin is not pale.      Findings: No erythema.   Neurological:      Mental Status: She is alert and oriented to person, place, and time.   Psychiatric:         Speech: Speech normal.         Behavior: Behavior normal.         Thought Content: Thought content normal.         Judgment: Judgment normal.         Assessment / Plan      Assessment/Plan:   Diagnoses and all orders for this visit:    1. Functional dyspepsia (Primary)  -     busPIRone (BUSPAR) 10 MG tablet; Take 1 tablet by mouth 3 (Three) Times a Day.  Dispense: 180 tablet; Refill: 3  -     pantoprazole (PROTONIX) 20 MG EC tablet; Take 1 tablet by mouth 2 (Two) Times a Day.  Dispense: 180 tablet; Refill: 3    2. Irritable bowel syndrome with diarrhea  -     busPIRone (BUSPAR) 10 MG tablet; Take 1 tablet by mouth 3 (Three) Times a Day.  Dispense: 180 tablet; Refill: 3    3. Bilious vomiting with nausea  -     ondansetron (Zofran) 4 MG tablet; Take 1 tablet by mouth Every 8 (Eight) Hours As Needed for Nausea or Vomiting.  Dispense: 30 tablet; Refill: 5     -     Stable, continue current treatment plan      Follow Up:   Return in about 1 year (around 6/6/2025), or if symptoms worsen or fail to improve.    Plan of care reviewed with the patient at the conclusion of today's visit.  Education was provided regarding diagnosis, management, and any prescribed or recommended OTC medications.  Patient verbalized understanding of and agreement with management plan.   .     KENN Damon  [facility]

## 2024-06-25 ENCOUNTER — TELEPHONE (OUTPATIENT)
Dept: OBSTETRICS AND GYNECOLOGY | Facility: CLINIC | Age: 24
End: 2024-06-25
Payer: COMMERCIAL

## 2024-06-25 DIAGNOSIS — Z30.41 SURVEILLANCE OF PREVIOUSLY PRESCRIBED CONTRACEPTIVE PILL: ICD-10-CM

## 2024-06-25 RX ORDER — LEVONORGESTREL / ETHINYL ESTRADIOL AND ETHINYL ESTRADIOL 150-30(84)
1 KIT ORAL DAILY
Qty: 91 EACH | Refills: 0 | Status: SHIPPED | OUTPATIENT
Start: 2024-06-25 | End: 2025-06-25

## 2024-08-13 ENCOUNTER — OFFICE VISIT (OUTPATIENT)
Dept: OBSTETRICS AND GYNECOLOGY | Facility: CLINIC | Age: 24
End: 2024-08-13
Payer: COMMERCIAL

## 2024-08-13 VITALS
BODY MASS INDEX: 21.94 KG/M2 | RESPIRATION RATE: 16 BRPM | SYSTOLIC BLOOD PRESSURE: 108 MMHG | WEIGHT: 120 LBS | DIASTOLIC BLOOD PRESSURE: 60 MMHG

## 2024-08-13 DIAGNOSIS — Z01.419 ENCOUNTER FOR WELL WOMAN EXAM WITH ROUTINE GYNECOLOGICAL EXAM: Primary | ICD-10-CM

## 2024-08-13 DIAGNOSIS — Z20.2 POSSIBLE EXPOSURE TO STI: ICD-10-CM

## 2024-08-13 DIAGNOSIS — Z30.41 SURVEILLANCE OF PREVIOUSLY PRESCRIBED CONTRACEPTIVE PILL: ICD-10-CM

## 2024-08-13 PROCEDURE — 99459 PELVIC EXAMINATION: CPT

## 2024-08-13 PROCEDURE — 99395 PREV VISIT EST AGE 18-39: CPT

## 2024-08-13 RX ORDER — LEVONORGESTREL / ETHINYL ESTRADIOL AND ETHINYL ESTRADIOL 150-30(84)
1 KIT ORAL DAILY
Qty: 91 EACH | Refills: 3 | Status: SHIPPED | OUTPATIENT
Start: 2024-08-13 | End: 2025-08-13

## 2024-08-13 NOTE — PROGRESS NOTES
Subjective   Chief Complaint   Patient presents with    Annual Exam     Yoshi Li is a 23 y.o. year old  presenting to be seen for her annual exam. She is overall feeling well today.     SEXUAL Hx:  She is currently sexually active.  In the past year there there has been NO new sexual partners.    Condoms are always used.  She would not like to be screened for STD's at today's exam.  Current birth control method: OCP (estrogen/progesterone).  She is happy with her current method of contraception and does not want to discuss alternative methods of contraception.  MENSTRUAL Hx:  Patient's last menstrual period was 07/15/2024.  In the past 6 months her cycles have been regular, predictable and occur ever 3 months. (She uses Seasonique) Her menstrual flow is typically light.   Each month on average there are roughly 0 day(s) of very heavy flow.    Intermenstrual bleeding is present - typically only in the third pack, a week or so before period is due .    Post-coital bleeding is present - occasional .  Dysmenorrhea: severe and is not affecting her activities of daily living- but much improved on KATLIN. She reports relief with NSAIDs and heat, as well as topical pain patches.   PMS: mild and is not affecting her activities of daily living  Her cycles are not a source of concern for her that she wishes to discuss today.  HEALTH Hx:  She exercises regularly: no (but is planning to start exercising more).  She wears her seat belt: yes.  She has concerns about domestic violence: no.  OTHER THINGS SHE WANTS TO DISCUSS TODAY:  Nothing else    The following portions of the patient's history were reviewed and updated as appropriate:problem list, current medications, allergies, past family history, past medical history, past social history, and past surgical history.    Social History    Tobacco Use      Smoking status: Never      Smokeless tobacco: Never      Review of Systems   Constitutional: Negative.   Negative for appetite change and diaphoresis.   Respiratory: Negative.     Cardiovascular: Negative.    Gastrointestinal: Negative.  Negative for abdominal distention, blood in stool, GERD and indigestion.   Endocrine: Negative.    Genitourinary: Negative.  Negative for breast discharge, breast lump, breast pain, dysuria and hematuria.   Skin: Negative.           Objective   /60   Resp 16   Wt 54.4 kg (120 lb)   LMP 07/15/2024   BMI 21.94 kg/m²     Physical Exam  Vitals and nursing note reviewed. Exam conducted with a chaperone present.   Constitutional:       Appearance: Normal appearance.   Cardiovascular:      Rate and Rhythm: Normal rate and regular rhythm.      Heart sounds: Normal heart sounds.   Pulmonary:      Effort: Pulmonary effort is normal.      Breath sounds: Normal breath sounds.   Chest:   Breasts:     Right: Normal.      Left: Normal.   Abdominal:      Palpations: Abdomen is soft.   Genitourinary:     General: Normal vulva.      Exam position: Lithotomy position.      Vagina: Normal.      Cervix: Normal.      Uterus: Normal.       Adnexa: Right adnexa normal and left adnexa normal.      Rectum: Normal.      Comments: Rectal exam not done but appears normal visually  Neurological:      Mental Status: She is alert and oriented to person, place, and time.   Psychiatric:         Mood and Affect: Mood normal.         Behavior: Behavior normal.         Thought Content: Thought content normal.         Judgment: Judgment normal.            Diagnoses and all orders for this visit:    1. Encounter for well woman exam with routine gynecological exam (Primary)  -     LIQUID-BASED PAP SMEAR WITH HPV GENOTYPING IF ASCUS ([facility]HY,[facility]AD); Future  -     LIQUID-BASED PAP SMEAR WITH HPV GENOTYPING IF ASCUS (TIMTOHY,COR,MAD)    2. Possible exposure to STI  -     LIQUID-BASED PAP SMEAR WITH HPV GENOTYPING IF ASCUS ([facility]OTHY,[facility]); Future  -     LIQUID-BASED PAP SMEAR WITH HPV GENOTYPING IF ASCUS ([facility]OTHY,COR,MAD)    3.  Surveillance of previously prescribed contraceptive pill  -     Levonorgest-Eth Estrad 91-Day (Seasonique) 0.15-0.03 &0.01 MG; Take 1 tablet by mouth Daily.  Dispense: 91 each; Refill: 3        Prescription(s) for OCP's were refilled today     The importance of keeping all planned follow-up and taking all medications as prescribed was emphasized.    Today I discussed with Yoshi the total recommended calcium intake for a premenopausal female is 1000 mg.  Ideally this should be from dietary sources.  I reviewed calcium content in various foods including milk, fortified orange juice and yogurt.  If she cannot get sufficient calcium through dietary means, it is recommended to supplement with either a multivitamin or calcium to reach her daily goal.  I also reviewed the difference in the bioavailability of calcium carbonate and calcium citrate containing supplements and the importance of taking calcium carbonate containing products with food.  Finally, vitamin D's role in calcium absorption was reviewed and a total daily vitamin D intake of 800 units was recommended.    I discussed with Yoshi that she may be behind on needed vaccinations for  vaccines are up to date .  She may be able to obtain these vaccinations at her local pharmacy OR speak about obtaining them with her primary care.  If she does obtain her vaccines, I have asked Yoshi to let us know the date each vaccine was obtained so that her medical record could be updated in our system.    New Medications Ordered This Visit   Medications    Levonorgest-Eth Estrad 91-Day (Seasonique) 0.15-0.03 &0.01 MG     Sig: Take 1 tablet by mouth Daily.     Dispense:  91 each     Refill:  3     PT MUST KEEP APPT FOR ADDITIONAL REFILLS.            Return in about 1 year (around 8/13/2025) for Annual physical.    Lou Mendoza, KENN  August 13, 2024

## 2024-08-18 LAB — REF LAB TEST METHOD: NORMAL

## 2025-05-22 DIAGNOSIS — K30 FUNCTIONAL DYSPEPSIA: ICD-10-CM

## 2025-05-22 DIAGNOSIS — K58.0 IRRITABLE BOWEL SYNDROME WITH DIARRHEA: ICD-10-CM

## 2025-05-22 RX ORDER — BUSPIRONE HYDROCHLORIDE 10 MG/1
10 TABLET ORAL 3 TIMES DAILY
Qty: 180 TABLET | Refills: 3 | Status: SHIPPED | OUTPATIENT
Start: 2025-05-22

## 2025-05-22 NOTE — TELEPHONE ENCOUNTER
Patient needs a refill of Buspar sent to [facility] in [place]. Scheduled for follow up with Ana Maria in June- she will wait to see new provider for f/up

## 2025-06-26 ENCOUNTER — OFFICE VISIT (OUTPATIENT)
Dept: OBSTETRICS AND GYNECOLOGY | Facility: CLINIC | Age: 25
End: 2025-06-26
Payer: COMMERCIAL

## 2025-06-26 VITALS
DIASTOLIC BLOOD PRESSURE: 60 MMHG | BODY MASS INDEX: 21.16 KG/M2 | HEIGHT: 62 IN | SYSTOLIC BLOOD PRESSURE: 90 MMHG | WEIGHT: 115 LBS

## 2025-06-26 DIAGNOSIS — N63.0 BREAST MASS IN FEMALE: Primary | ICD-10-CM

## 2025-06-26 PROCEDURE — 99213 OFFICE O/P EST LOW 20 MIN: CPT | Performed by: OBSTETRICS & GYNECOLOGY

## 2025-06-26 NOTE — PROGRESS NOTES
"Subjective   Chief Complaint   Patient presents with    Breast Problem     [place] size knot left breast 9:00 noticed a month ago      Yoshi Li is a 24 y.o. year old .  Patient's last menstrual period was 2025 (approximate).  She presents with a concern for a breast mass.  She has noticed this for about 1 month.  It is not particularly tender and has not changed in character since she initially noticed it.  She is on oral contraceptive 3-month cycle so has not had a menstrual cycle since noticing this area.  She has no significant family history of breast cancer.  There are no other breast changes such as changes in skin pain dimpling.      The following portions of the patient's history were reviewed and updated as appropriate:current medications and allergies    Social History    Tobacco Use      Smoking status: Never      Smokeless tobacco: Never    Review of Systems  Constitutional POS: nothing reported    NEG: anorexia or night sweats   Genitourinary POS: nothing reported    NEG: dysuria or hematuria   Gastointestinal POS: nothing reported    NEG: bloating, change in bowel habits, melena, or reflux symptoms   Integument POS: nothing reported    NEG: moles that are changing in size, shape, color or rashes   Breast POS: see HPI    NEG: skin dimpling, breast tenderness, nipple discharge, or redness         Objective   BP 90/60   Ht 157.5 cm (62.01\")   Wt 52.2 kg (115 lb)   LMP 2025 (Approximate)   Breastfeeding No   BMI 21.03 kg/m²     General:  well developed; well nourished  no acute distress  mentation appropriate   Skin:  Not performed.   Thyroid: not examined   Lungs:  breathing is unlabored   Heart:  Not performed.   Breasts:  Examined in supine position  Nipples normal without inversion, lesions or discharge  There are no palpable axillary nodes  There is an ~  1 cm mass is present in the left breast at 2 o'clock.  It is 5 cm from the outer edge of the areola.  It is " freely mobile.  Cystic in nature, smooth   Abdomen: Not performed.   Pelvis: Not performed.     Lab Review   No data reviewed    Imaging   No data reviewed        Assessment   Small breast mass of left breast with virtually all characteristics of benignity     Plan   Observe for now.  Reassess at next annual exam which is approximately 8 weeks away  The importance of keeping all planned follow-up and taking all medications as prescribed was emphasized.  Follow up for annual exam 2 months    No orders of the defined types were placed in this encounter.         This note was electronically signed.    Adriano Tovar MD  June 26, 2025    Part of this note may be an electronic transcription/translation of spoken language to printed text using the Dragon Dictation System.

## 2025-07-25 ENCOUNTER — LAB (OUTPATIENT)
Dept: LAB | Facility: HOSPITAL | Age: 25
End: 2025-07-25
Payer: COMMERCIAL

## 2025-07-25 DIAGNOSIS — R63.4 WEIGHT LOSS, ABNORMAL: ICD-10-CM

## 2025-07-25 LAB
ALBUMIN SERPL-MCNC: 4.4 G/DL (ref 3.5–5.2)
ALBUMIN/GLOB SERPL: 1.6 G/DL
ALP SERPL-CCNC: 56 U/L (ref 39–117)
ALT SERPL W P-5'-P-CCNC: 18 U/L (ref 1–33)
ANION GAP SERPL CALCULATED.3IONS-SCNC: 12.1 MMOL/L (ref 5–15)
AST SERPL-CCNC: 13 U/L (ref 1–32)
BASOPHILS # BLD AUTO: 0.03 10*3/MM3 (ref 0–0.2)
BASOPHILS NFR BLD AUTO: 0.5 % (ref 0–1.5)
BILIRUB SERPL-MCNC: 0.5 MG/DL (ref 0–1.2)
BUN SERPL-MCNC: 10 MG/DL (ref 6–20)
BUN/CREAT SERPL: 9.7 (ref 7–25)
CALCIUM SPEC-SCNC: 9.6 MG/DL (ref 8.6–10.5)
CHLORIDE SERPL-SCNC: 106 MMOL/L (ref 98–107)
CO2 SERPL-SCNC: 21.9 MMOL/L (ref 22–29)
CREAT SERPL-MCNC: 1.03 MG/DL (ref 0.57–1)
CRP SERPL-MCNC: <0.3 MG/DL (ref 0–0.5)
DEPRECATED RDW RBC AUTO: 37.8 FL (ref 37–54)
EGFRCR SERPLBLD CKD-EPI 2021: 78 ML/MIN/1.73
EOSINOPHIL # BLD AUTO: 0.06 10*3/MM3 (ref 0–0.4)
EOSINOPHIL NFR BLD AUTO: 1 % (ref 0.3–6.2)
ERYTHROCYTE [DISTWIDTH] IN BLOOD BY AUTOMATED COUNT: 11.8 % (ref 12.3–15.4)
ERYTHROCYTE [SEDIMENTATION RATE] IN BLOOD: 4 MM/HR (ref 0–20)
GLOBULIN UR ELPH-MCNC: 2.8 GM/DL
GLUCOSE SERPL-MCNC: 80 MG/DL (ref 65–99)
HCT VFR BLD AUTO: 41.6 % (ref 34–46.6)
HGB BLD-MCNC: 13.8 G/DL (ref 12–15.9)
IMM GRANULOCYTES # BLD AUTO: 0.01 10*3/MM3 (ref 0–0.05)
IMM GRANULOCYTES NFR BLD AUTO: 0.2 % (ref 0–0.5)
LYMPHOCYTES # BLD AUTO: 1.96 10*3/MM3 (ref 0.7–3.1)
LYMPHOCYTES NFR BLD AUTO: 32.1 % (ref 19.6–45.3)
MCH RBC QN AUTO: 29.2 PG (ref 26.6–33)
MCHC RBC AUTO-ENTMCNC: 33.2 G/DL (ref 31.5–35.7)
MCV RBC AUTO: 87.9 FL (ref 79–97)
MONOCYTES # BLD AUTO: 0.47 10*3/MM3 (ref 0.1–0.9)
MONOCYTES NFR BLD AUTO: 7.7 % (ref 5–12)
NEUTROPHILS NFR BLD AUTO: 3.57 10*3/MM3 (ref 1.7–7)
NEUTROPHILS NFR BLD AUTO: 58.5 % (ref 42.7–76)
NRBC BLD AUTO-RTO: 0 /100 WBC (ref 0–0.2)
PLATELET # BLD AUTO: 297 10*3/MM3 (ref 140–450)
PMV BLD AUTO: 10.2 FL (ref 6–12)
POTASSIUM SERPL-SCNC: 4.3 MMOL/L (ref 3.5–5.2)
PROT SERPL-MCNC: 7.2 G/DL (ref 6–8.5)
RBC # BLD AUTO: 4.73 10*6/MM3 (ref 3.77–5.28)
SODIUM SERPL-SCNC: 140 MMOL/L (ref 136–145)
TSH SERPL DL<=0.05 MIU/L-ACNC: 0.78 UIU/ML (ref 0.27–4.2)
WBC NRBC COR # BLD AUTO: 6.1 10*3/MM3 (ref 3.4–10.8)

## 2025-07-25 PROCEDURE — 80050 GENERAL HEALTH PANEL: CPT

## 2025-07-25 PROCEDURE — 36415 COLL VENOUS BLD VENIPUNCTURE: CPT

## 2025-07-25 PROCEDURE — 86258 DGP ANTIBODY EACH IG CLASS: CPT

## 2025-07-25 PROCEDURE — 86364 TISS TRNSGLTMNASE EA IG CLAS: CPT

## 2025-07-25 PROCEDURE — 85652 RBC SED RATE AUTOMATED: CPT

## 2025-07-25 PROCEDURE — 82784 ASSAY IGA/IGD/IGG/IGM EACH: CPT

## 2025-07-25 PROCEDURE — 86140 C-REACTIVE PROTEIN: CPT

## 2025-08-07 ENCOUNTER — RESULTS FOLLOW-UP (OUTPATIENT)
Dept: GASTROENTEROLOGY | Facility: CLINIC | Age: 25
End: 2025-08-07
Payer: COMMERCIAL

## 2025-08-11 ENCOUNTER — HOSPITAL ENCOUNTER (OUTPATIENT)
Dept: CT IMAGING | Facility: HOSPITAL | Age: 25
Discharge: HOME | End: 2025-08-11
Admitting: NURSE PRACTITIONER
Payer: COMMERCIAL

## 2025-08-11 DIAGNOSIS — R10.9 ABDOMINAL CRAMPING: ICD-10-CM

## 2025-08-11 DIAGNOSIS — K21.00 GASTROESOPHAGEAL REFLUX DISEASE WITH ESOPHAGITIS, UNSPECIFIED WHETHER HEMORRHAGE: ICD-10-CM

## 2025-08-11 DIAGNOSIS — R63.4 WEIGHT LOSS, ABNORMAL: ICD-10-CM

## 2025-08-11 PROCEDURE — 25510000001 IOPAMIDOL 61 % SOLUTION: Performed by: NURSE PRACTITIONER

## 2025-08-11 PROCEDURE — 74177 CT ABD & PELVIS W/CONTRAST: CPT

## 2025-08-11 RX ORDER — IOPAMIDOL 612 MG/ML
100 INJECTION, SOLUTION INTRAVASCULAR
Status: COMPLETED | OUTPATIENT
Start: 2025-08-11 | End: 2025-08-11

## 2025-08-11 RX ADMIN — IOPAMIDOL 90 ML: 612 INJECTION, SOLUTION INTRAVENOUS at 15:19
